# Patient Record
Sex: MALE | Race: WHITE | Employment: FULL TIME | URBAN - METROPOLITAN AREA
[De-identification: names, ages, dates, MRNs, and addresses within clinical notes are randomized per-mention and may not be internally consistent; named-entity substitution may affect disease eponyms.]

---

## 2018-06-22 ENCOUNTER — ED HISTORICAL/CONVERTED ENCOUNTER (OUTPATIENT)
Dept: OTHER | Age: 28
End: 2018-06-22

## 2019-01-08 ENCOUNTER — OFFICE VISIT (OUTPATIENT)
Dept: FAMILY MEDICINE CLINIC | Age: 29
End: 2019-01-08

## 2019-01-08 VITALS
DIASTOLIC BLOOD PRESSURE: 66 MMHG | OXYGEN SATURATION: 96 % | BODY MASS INDEX: 21.28 KG/M2 | TEMPERATURE: 98.1 F | WEIGHT: 152 LBS | HEART RATE: 91 BPM | RESPIRATION RATE: 16 BRPM | SYSTOLIC BLOOD PRESSURE: 108 MMHG | HEIGHT: 71 IN

## 2019-01-08 DIAGNOSIS — F19.10 IV DRUG ABUSE (HCC): ICD-10-CM

## 2019-01-08 DIAGNOSIS — B19.20 HEPATITIS C VIRUS INFECTION WITHOUT HEPATIC COMA, UNSPECIFIED CHRONICITY: ICD-10-CM

## 2019-01-08 DIAGNOSIS — K40.90 SCROTAL HERNIA: ICD-10-CM

## 2019-01-08 DIAGNOSIS — R51.9 CHRONIC NONINTRACTABLE HEADACHE, UNSPECIFIED HEADACHE TYPE: Primary | ICD-10-CM

## 2019-01-08 DIAGNOSIS — G89.29 CHRONIC NONINTRACTABLE HEADACHE, UNSPECIFIED HEADACHE TYPE: Primary | ICD-10-CM

## 2019-01-08 RX ORDER — IBUPROFEN 800 MG/1
TABLET ORAL
COMMUNITY

## 2019-01-08 RX ORDER — SUMATRIPTAN 50 MG/1
50 TABLET, FILM COATED ORAL
Qty: 9 TAB | Refills: 5 | Status: SHIPPED | OUTPATIENT
Start: 2019-01-08 | End: 2019-01-08

## 2019-01-08 RX ORDER — AMITRIPTYLINE HYDROCHLORIDE 25 MG/1
25 TABLET, FILM COATED ORAL
Qty: 30 TAB | Refills: 2 | Status: SHIPPED | OUTPATIENT
Start: 2019-01-08 | End: 2019-10-23

## 2019-01-08 NOTE — PROGRESS NOTES
Nancy Cook is a 33 y.o. male Chief Complaint Patient presents with [facility] Establish Care  Headache Pt states he started getting HA's around Feb of 2017. Pt states he had a heroin addiction and went to longterm in April for 6 months. Pt detoxed in longterm on suboxone with a 7 day taper. He has been sober since and uses no maintenance meds. States HA is in the back of his head and his hearing will decrease some and his neck will get stiff. Denies pounding HA. States the hearing issue comes in waves. Pt has used motrin to help dull this. Pt states while he was in longterm approx 1 month in he got a severe HA and lost feeling in his legs and fell down. Pt went to medical and started having slurred speech then couldn't remember what happened. Pt states he was transferred to [place] and had another episode there. He was taken to the hospital, he believes SSR and was worked up for meningitis negative and had a CT and states he did not get the result. Pt was at ED for the day and released. Pt states he would get leg tingling. Pt states he had a bad HA last night and states since getting out longterm end of September has not had as severe of a HA to have the numbness in legs and blacking out. Pt is using excedrin migraine and feels he is using too much. Pt is taking excedrin migraine 3-4x a day everyday. But states last dose was last week. Heroin was IVDA and pt does report having Hep C. Pt does not have a hepatologist.  Pt will have been sober 9 months on 1/13/18. Pt also reports a hernia R inguinal region states bothers him from time to time. No pain currently and noticed 8 years ago. he is a 33y.o. year old male who presents for evalution. Reviewed PmHx, RxHx, FmHx, SocHx, AllgHx and updated and dated in the chart. Review of Systems - negative except as listed above in the HPI Objective:  
 
Vitals:  
 01/08/19 8375 BP: 108/66 Pulse: 91  
Resp: 16 Temp: 98.1 °F (36.7 °C) TempSrc: Oral  
SpO2: 96% Weight: 152 lb (68.9 kg) Height: 5' 11\" (1.803 m) Current Outpatient Medications Medication Sig  
 aspirin-acetaminophen-caffeine (EXCEDRIN ES) 250-250-65 mg per tablet Take 1 Tab by mouth.  ibuprofen (MOTRIN) 800 mg tablet Take  by mouth.  amitriptyline (ELAVIL) 25 mg tablet Take 1 Tab by mouth nightly.  SUMAtriptan (IMITREX) 50 mg tablet Take 1 Tab by mouth once as needed for Migraine for up to 1 dose. No current facility-administered medications for this visit. Physical Examination: General appearance - alert, well appearing, and in no distress Mental status - alert, oriented to person, place, and time, anxious Neck - supple, no significant adenopathy Chest - clear to auscultation, no wheezes, rales or rhonchi, symmetric air entry Heart - normal rate, regular rhythm, normal S1, S2, no murmurs, rubs, clicks or gallops Abdomen - HERNIA EXAM: left inguinal hernia Neurological - alert, oriented, normal speech, no focal findings or movement disorder noted, cranial nerves II through XII intact Musculoskeletal - no joint tenderness, deformity or swelling Assessment/ Plan:  
Diagnoses and all orders for this visit: 
 
1. Chronic nonintractable headache, unspecified headache type 
-     REFERRAL TO NEUROLOGY 
-     amitriptyline (ELAVIL) 25 mg tablet; Take 1 Tab by mouth nightly. 2. Hepatitis C virus infection without hepatic coma, unspecified chronicity 
-     CBC WITH AUTOMATED DIFF 
-     HIV 1/2 AG/AB, 4TH GENERATION,W RFLX CONFIRM 
-     HEPATITIS C QT BY PCR WITH REFLEX GENOTYPE 
-     METABOLIC PANEL, COMPREHENSIVE 
-     REFERRAL TO LIVER HEPATOLOGY 3. IV drug abuse (HCC) 
-     CBC WITH AUTOMATED DIFF 
-     HIV 1/2 AG/AB, 4TH GENERATION,W RFLX CONFIRM 
-     HEPATITIS C QT BY PCR WITH REFLEX GENOTYPE 
-     METABOLIC PANEL, COMPREHENSIVE 4. Scrotal hernia 
-     REFERRAL TO GENERAL SURGERY -     SUMAtriptan (IMITREX) 50 mg tablet; Take 1 Tab by mouth once as needed for Migraine for up to 1 dose. Follow-up Disposition: 
Return if symptoms worsen or fail to improve. I have discussed the diagnosis with the patient and the intended plan as seen in the above orders. The patient has received an after-visit summary and questions were answered concerning future plans. Pt conveyed understanding of plan. Medication Side Effects and Warnings were discussed with patient [address] [facility], DO

## 2019-01-08 NOTE — PROGRESS NOTES
Chief Complaint Patient presents with [facility] Establish Care  Headache Patient presents in office today to establish PCP. Has c/o headaches off and on for the past 10 months. States that when he gets the headaches his neck gets stiff, he gets dizzy and hard of hearing. Treating with Excedrin with some relief. No other concerns. Learning Assessment 1/8/2019 PRIMARY LEARNER Patient HIGHEST LEVEL OF EDUCATION - PRIMARY LEARNER  GRADUATED HIGH SCHOOL OR GED  
BARRIERS PRIMARY LEARNER NONE PRIMARY LANGUAGE ENGLISH  
LEARNER PREFERENCE PRIMARY DEMONSTRATION  
ANSWERED BY patient RELATIONSHIP SELF

## 2019-01-09 LAB
ALBUMIN SERPL-MCNC: 4.7 G/DL (ref 3.5–5.5)
ALBUMIN/GLOB SERPL: 1.7 {RATIO} (ref 1.2–2.2)
ALP SERPL-CCNC: 68 IU/L (ref 39–117)
ALT SERPL-CCNC: 39 IU/L (ref 0–44)
AST SERPL-CCNC: 25 IU/L (ref 0–40)
BASOPHILS # BLD AUTO: 0 X10E3/UL (ref 0–0.2)
BASOPHILS NFR BLD AUTO: 0 %
BILIRUB SERPL-MCNC: 0.4 MG/DL (ref 0–1.2)
BUN SERPL-MCNC: 16 MG/DL (ref 6–20)
BUN/CREAT SERPL: 20 (ref 9–20)
CALCIUM SERPL-MCNC: 9.8 MG/DL (ref 8.7–10.2)
CHLORIDE SERPL-SCNC: 104 MMOL/L (ref 96–106)
CO2 SERPL-SCNC: 23 MMOL/L (ref 20–29)
CREAT SERPL-MCNC: 0.81 MG/DL (ref 0.76–1.27)
EOSINOPHIL # BLD AUTO: 0.1 X10E3/UL (ref 0–0.4)
EOSINOPHIL NFR BLD AUTO: 1 %
ERYTHROCYTE [DISTWIDTH] IN BLOOD BY AUTOMATED COUNT: 13.2 % (ref 12.3–15.4)
GLOBULIN SER CALC-MCNC: 2.8 G/DL (ref 1.5–4.5)
GLUCOSE SERPL-MCNC: 88 MG/DL (ref 65–99)
HCT VFR BLD AUTO: 43.7 % (ref 37.5–51)
HGB BLD-MCNC: 14.6 G/DL (ref 13–17.7)
HIV 1+2 AB+HIV1 P24 AG SERPL QL IA: NON REACTIVE
IMM GRANULOCYTES # BLD AUTO: 0 X10E3/UL (ref 0–0.1)
IMM GRANULOCYTES NFR BLD AUTO: 0 %
LYMPHOCYTES # BLD AUTO: 2 X10E3/UL (ref 0.7–3.1)
LYMPHOCYTES NFR BLD AUTO: 21 %
MCH RBC QN AUTO: 30.3 PG (ref 26.6–33)
MCHC RBC AUTO-ENTMCNC: 33.4 G/DL (ref 31.5–35.7)
MCV RBC AUTO: 91 FL (ref 79–97)
MONOCYTES # BLD AUTO: 0.7 X10E3/UL (ref 0.1–0.9)
MONOCYTES NFR BLD AUTO: 7 %
NEUTROPHILS # BLD AUTO: 6.7 X10E3/UL (ref 1.4–7)
NEUTROPHILS NFR BLD AUTO: 71 %
PLATELET # BLD AUTO: 298 X10E3/UL (ref 150–379)
POTASSIUM SERPL-SCNC: 4.3 MMOL/L (ref 3.5–5.2)
PROT SERPL-MCNC: 7.5 G/DL (ref 6–8.5)
RBC # BLD AUTO: 4.82 X10E6/UL (ref 4.14–5.8)
SODIUM SERPL-SCNC: 142 MMOL/L (ref 134–144)
WBC # BLD AUTO: 9.5 X10E3/UL (ref 3.4–10.8)

## 2019-01-09 NOTE — PROGRESS NOTES
HIV is negative and labs are all normal.  I am still waiting for the Hep C lab to come back and this will take up to a week.

## 2019-01-11 LAB
HCV GENOTYPE: NORMAL
HCV GENTYP SERPL NAA+PROBE: NORMAL
HCV RNA SERPL NAA+PROBE-ACNC: NORMAL IU/ML
HCV RNA SERPL NAA+PROBE-LOG IU: 6.29 LOG10 IU/ML
PLEASE NOTE, 550474: NORMAL
TEST INFORMATION: NORMAL

## 2019-01-11 NOTE — PROGRESS NOTES
Called and spoke with patient in reference to labs.  Patient verbalized understanding and will contact hepatology

## 2019-01-15 ENCOUNTER — OFFICE VISIT (OUTPATIENT)
Dept: NEUROLOGY | Age: 29
End: 2019-01-15

## 2019-01-15 VITALS
OXYGEN SATURATION: 97 % | SYSTOLIC BLOOD PRESSURE: 110 MMHG | BODY MASS INDEX: 21.56 KG/M2 | HEART RATE: 107 BPM | DIASTOLIC BLOOD PRESSURE: 77 MMHG | HEIGHT: 71 IN | RESPIRATION RATE: 20 BRPM | WEIGHT: 154 LBS

## 2019-01-15 DIAGNOSIS — G44.209 TENSION VASCULAR HEADACHE: Primary | ICD-10-CM

## 2019-01-15 NOTE — PROGRESS NOTES
Patient is new, has been having some paralysis from the knees down- happened only once or twice. Headaches- started March 2018, once or twice a week, happening in the back of the head. Neck stiffness. Patient states that he will have some hearing loss.

## 2019-01-15 NOTE — PROGRESS NOTES
Name:  Delgado Ramirez      :  1990    PCP:   Meggan Dyer DO      Referring:  As above  MRN:   [id number]    Chief Complaint:   Chief Complaint   Patient presents with    Headache     New patient-Headaches, Numbness, Paralysis       HISTORY OF PRESENT ILLNESS:     This is a 33 y.o. male with PMHx Substance Abuse/ IVDU-Heroin status post detox in residential (2018) on Suboxone x 7 days and sober since, Hx of Hep C+, who is referred to discuss frequent headaches. Reviewed Dr Alexa Min clinic note dated 19. Pt describes pain on back of head associated with neck stiffness and some change/ decrease in hearing. The hearing issues is intermittent. Uses Motrin to dull head/ neck pain. While in residential, he reports having an episode where he had a severe headache, lost feeling in his legs, fell down, had slurred speech, and was transferred to [facility] ER for evaluation and had another episode while there. He recalls having a CT head while there but doesn't know the results. Got out of residential in 2018, hasn't been having as severe headaches (not to the point of leg numbness or blacking out), but using Excedrin migraine 3-4 times a day. Was prescribed Amitriptyline 25 mg tablet QHS and sumatriptan 50 mg tab to use prn but hasn't picked up Rxs yet. Describes that everyday he has a pressure sensation from mostly in back of head and down into neck, occasional nausea and dizziness, no vomiting, sometimes light or sensitivity. Duration: 6 hrs or full day until he takes something He can't identify any triggers, still taking the Excedrin 3-4 times a day. Denies depression, says has mild intermittent anxiety.   Sleeping on average 6-8 hours, going to sleep around 11 PM most nights waking around 7-8 AM.      Complete Review of Systems: + fatigue, headaches, hepatitis, nausea/ vomiting, tinnitus; otherwise as noted in HPI     No Known Allergies  Past Medical History:   Diagnosis Date    Migraine      Current Outpatient Medications   Medication Sig Dispense Refill    aspirin-acetaminophen-caffeine (EXCEDRIN ES) 250-250-65 mg per tablet Take 1 Tab by mouth.  ibuprofen (MOTRIN) 800 mg tablet Take  by mouth.  amitriptyline (ELAVIL) 25 mg tablet Take 1 Tab by mouth nightly. 30 Tab 2     Past Surgical History:   Procedure Laterality Date    HX ORTHOPAEDIC       Family History   Adopted: Yes     Social History     Socioeconomic History    Marital status: SINGLE     Spouse name: Not on file    Number of children: Not on file    Years of education: Not on file    Highest education level: Not on file   Social Needs    Financial resource strain: Not on file    Food insecurity - worry: Not on file    Food insecurity - inability: Not on file    Transportation needs - medical: Not on file   [employer] needs - non-medical: Not on file   Occupational History    Not on file   Tobacco Use    Smoking status: Current Every Day Smoker     Packs/day: 1.00    Smokeless tobacco: Never Used   Substance and Sexual Activity    Alcohol use: Yes     Alcohol/week: 6.0 oz     Types: 10 Cans of beer per week    Drug use: No    Sexual activity: Yes     Birth control/protection: None   Other Topics Concern    Not on file   Social History Narrative    Not on file       PHYSICAL EXAM  Vitals:    01/15/19 1458   BP: 110/77   Pulse: (!) 107   Resp: 20   SpO2: 97%   Weight: 69.9 kg (154 lb)   Height: 5' 11\" (1.803 m)       General:  Alert, appears anxious but pleasant and coopertive   Head:  Normocephalic, atraumatic. Eyes:  Conjunctivae/corneas clear   Lungs:  Heart:  Non labored breathing  Mild tachycardia    Extremities: No edema.    Skin: No rashes    Neurologic Exam       Language: normal  Memory:  Alert, oriented to person, place, situation    Cranial Nerves:  I: smell Not tested   II: visual fields Full to confrontation   II: pupils Equal, round, reactive to light   II: optic disc No papilledema   III,VII: ptosis none   III,IV,VI: extraocular muscles  normal   V: facial light touch sensation  normal   VII: facial muscle function  symmetric   VIII: hearing symmetric   IX: soft palate elevation  normal   XI: sternocleidomastoid strength 5/5   XII: tongue  midline      Motor: normal bulk, tone, strength in all exts  Sensory: intact to LT x 4 exts   Cerebellar: no rest tremors, + mild bilateral postural and intention tremor   Normal FNF and H-Shin bilaterally  Reflexes: 2+ throughout  Plantar response: not examined     Gait: normal    Romberg negative     ASSESSMENT AND PLAN    ICD-10-CM ICD-9-CM    1. Tension vascular headache G44.209 307.81        Discussed with patient that the amitriptyline he was prescribed by PCP but hasn't started yet is to reduce headache frequency. Advised him to start that, to stop all OTC headache pain relievers (due to potential rebound headache), and to also  the Rx for Sumatriptan 50 mg tablets and only use them 2 day a week if needed. If headaches are still frequent, will increase the amitriptyline. Patient agreed to that plan. F/u in 6 weeks.        Signed By: Eva Piper MD     January 15, 2019

## 2019-01-29 ENCOUNTER — OFFICE VISIT (OUTPATIENT)
Dept: HEMATOLOGY | Age: 29
End: 2019-01-29

## 2019-01-29 VITALS
TEMPERATURE: 97.9 F | OXYGEN SATURATION: 100 % | HEART RATE: 101 BPM | WEIGHT: 142.6 LBS | DIASTOLIC BLOOD PRESSURE: 58 MMHG | SYSTOLIC BLOOD PRESSURE: 102 MMHG | HEIGHT: 71 IN | BODY MASS INDEX: 19.96 KG/M2

## 2019-01-29 DIAGNOSIS — B18.2 CHRONIC HEPATITIS C WITHOUT HEPATIC COMA (HCC): Primary | ICD-10-CM

## 2019-01-29 PROBLEM — G44.229 CHRONIC TENSION HEADACHES: Status: ACTIVE | Noted: 2019-01-29

## 2019-01-29 RX ORDER — SUMATRIPTAN 50 MG/1
TABLET, FILM COATED ORAL
Refills: 5 | COMMUNITY
Start: 2019-01-21 | End: 2019-10-11

## 2019-01-29 NOTE — PROGRESS NOTES
[address] [address] [address], [place], [address] [address], [place], [place]       Bruno Monsalve, JULI Avitia, RANJAN-BC   KAY Franks NP [address] [facility] [address]    at [address], [address] [place]  22.    [phone]    FAX: [address] [place] [address], [address], [address]    [phone]    FAX: [phone]       Patient Care Team:  Niki Bowles DO as PCP - General (Family Practice)      Problem List  Date Reviewed: 1/29/2019          Codes Class Noted    Chronic hepatitis C ([facility] ICD-10-CM: B18.2  ICD-9-CM: 070.54  1/29/2019        Chronic tension headaches ICD-10-CM: F88.520  ICD-9-CM: 339.12  1/29/2019              The clinicians listed above have asked me to see Jacques Cochran in consultation regarding chronic HCV and its management. All medical records sent by the referring physicians were reviewed. The patient is a 33 y.o.  male who was found to have abnormalities in liver chemistries and subsequently tested positive for chronic HCV after being incarcerated in 4/2018. Risk factors for acquiring HCV are IV drug use between the years of 2877-3846. He went through detox program while in long term and was released in 9/2018, he has been without IVD for ~9 months. Imaging of the liver was not performed. An assessment of liver fibrosis with biopsy or elastography has not been performed. The patient has never received treatment for chronic HCV. The patient has no symptoms which can be attributed to the liver disorder.     The patient has not experienced fatigue, fevers, chills, diffuse abdominal pain, nausea, vomiting, constipation, diarrhea, yellowing of the eyes or skin, swelling of the abdomen, or swelling of the lower extremities. The patient completes all daily activities without any functional limitations. ASSESSMENT AND PLAN:  Chronic HCV   Chronic HCV of unclear severity. Severity of the liver disease will be assessed by estimate with HCV Fibrosure, I have drawn this in the office today. He is unlikely to have advanced fibrosis as he has had exposures to HCV within the past 10 years and has well-supported laboratory studies. Liver transaminases are normal.  Liver function is normal.  The platelet count is normal.      Based upon laboratory studies the patient does not appear to have significant liver injury. As above, will determine HCV Fibrosure at baseline. Will perform  virologic studies to assess for other causes of chronic liver disease. The patient has not been treated for HCV. The patient has HCV genotype 1A. We have discussed the treatment alternatives. The SVR/cure rate for HCV now exceeds 97% without significant side effects for most patients with HCV. The specific treatment is dependent upon genotype, viral load and histology. Patient should be treated with a 8 week course of Harvoni (sofasbuvir and ledipasvir) or Mavyret (glecaprevir and piprentasvir). I have reviewed with him the administration and side effects of these medications and he is willing to proceed, this will be ordered. Treatment of other medical problems in patients with chronic liver disease  There are no contraindications for the patient to take most medications that are necessary for treatment of other medical issues. Patient may remain on present medications for management of headaches. Counseling for alcohol in patients with chronic liver disease  The patient was counseled regarding alcohol consumption and the effect of alcohol on chronic liver disease. The patient does not consume any significant amount of alcohol.  I have asked him to eliminate all alcohol during the time of his HCV therapy. Drug use  The patient was counseled regarding the risk of overdose and death from using narcotic drugs and the risk of becoming reinfected with HCV once they are cured if they resume narcotic drug use. The patient has not used drugs since 4/2018. Vaccinations   The need for vaccination against viral hepatitis A and B will be assessed with serologic and instituted as appropriate. Routine vaccinations against other bacterial and viral agents can be performed as indicated. Annual flu vaccination should be administered if indicated. ALLERGIES  No Known Allergies    MEDICATIONS  Current Outpatient Medications   Medication Sig    SUMAtriptan (IMITREX) 50 mg tablet TK 1 T PO 1 TIME FOR UP TO 1 DOSE PRF MIGRAINE    aspirin-acetaminophen-caffeine (EXCEDRIN ES) 250-250-65 mg per tablet Take 1 Tab by mouth every six (6) hours as needed.  ibuprofen (MOTRIN) 800 mg tablet Take  by mouth every six (6) hours as needed.  amitriptyline (ELAVIL) 25 mg tablet Take 1 Tab by mouth nightly. No current facility-administered medications for this visit. SYSTEM REVIEW NOT RELATED TO LIVER DISEASE OR REVIEWED ABOVE:  Constitution systems: Negative for fever, chills, weight gain, weight loss. Eyes: Negative for visual changes. ENT: Negative for sore throat, painful swallowing. Respiratory: Negative for cough, hemoptysis, SOB. Cardiology: Negative for chest pain, palpitations. GI:  Negative for constipation or diarrhea. : Negative for urinary frequency, dysuria, hematuria, nocturia. Skin: Negative for rash. Hematology: Negative for easy bruising, blood clots. Musculo-skeletal: Negative for back pain, muscle pain, weakness. Neurologic: Negative for dizziness, vertigo, memory problems not related to HE. Positive for headaches. Psychology: Negative for anxiety, depression.      FAMILY HISTORY:  The patient is adopted and does not know any of his family history. SOCIAL HISTORY:  The patient has never been . The patient has no children. The patient currently smokes 1 pack of tobacco daily. The patient consumes 3-5 alcoholic beverages per week. The patient currently works full time . PHYSICAL EXAMINATION:  Visit Vitals  /58 (BP 1 Location: Left arm, BP Patient Position: Sitting)   Pulse (!) 101   Temp 97.9 °F (36.6 °C) (Tympanic)   Ht 5' 11\" (1.803 m)   Wt 142 lb 9.6 oz (64.7 kg)   SpO2 100%   BMI 19.89 kg/m²     General: No acute distress. Eyes: Sclera anicteric. ENT: No oral lesions. Thyroid normal.  Nodes: No adenopathy. Skin: No spider angiomata. No jaundice. No palmar erythema. Respiratory: Lungs clear to auscultation. Cardiovascular: Regular heart rate. No murmurs. No JVD. Abdomen: Soft non-tender. Liver size normal to percussion/palpation. Spleen not palpable. No obvious ascites. Extremities: No edema. No muscle wasting. No gross arthritic changes. Neurologic: Alert and oriented. Cranial nerves grossly intact. No asterixis. LABORATORY STUDIES:  [facility] [place] of [address] [place] [address] Units 1/8/2019   WBC 3.4 - 10.8 x10E3/uL 9.5   ANC 1.4 - 7.0 x10E3/uL 6.7   HGB 13.0 - 17.7 g/dL 14.6    - 379 x10E3/uL 298   AST 0 - 40 IU/L 25   ALT 0 - 44 IU/L 39   Alk Phos 39 - 117 IU/L 68   Bili, Total 0.0 - 1.2 mg/dL 0.4   Albumin 3.5 - 5.5 g/dL 4.7   BUN 6 - 20 mg/dL 16   Creat 0.76 - 1.27 mg/dL 0.81   Na 134 - 144 mmol/L 142   K 3.5 - 5.2 mmol/L 4.3   Cl 96 - 106 mmol/L 104   CO2 20 - 29 mmol/L 23   Glucose 65 - 99 mg/dL 88   Additional lab values drawn at today's office visit are pending at the time of documentation. SEROLOGIES:  Serologies Latest Ref Rng & Units 1/8/2019   Hep C Genotype  1a   HCV RT-PCR, Quant IU/mL 1,930,000   HCV Log10 log10 IU/mL 6.286     LIVER HISTOLOGY:  Not available or performed.  Additional lab values drawn at today's office visit are pending at the time of documentation. ENDOSCOPIC PROCEDURES:  Not available or performed    RADIOLOGY:  Not available or performed    OTHER TESTING:  Not available or performed    FOLLOW-UP:  All of the issues listed above in the Assessment and Plan were discussed with the patient. All questions were answered. The patient expressed a clear understanding of the above. [address] [place] [address] within 4 weeks of initiation of HCV therapy.     Rossy Kirby PA-C  [facility] [place] [address], [address] [facility], [facility] 22.  [phone]

## 2019-01-29 NOTE — PROGRESS NOTES
Chief Complaint   Patient presents with    New Patient     Hep C      Visit Vitals  /58 (BP 1 Location: Left arm, BP Patient Position: Sitting)   Pulse (!) 101   Temp 97.9 °F (36.6 °C) (Tympanic)   Ht 5' 11\" (1.803 m)   Wt 142 lb 9.6 oz (64.7 kg)   SpO2 100%   BMI 19.89 kg/m²     PHQ over the last two weeks 1/29/2019   Little interest or pleasure in doing things Not at all   Feeling down, depressed, irritable, or hopeless Not at all   Total Score PHQ 2 0     Learning Assessment 1/29/2019   PRIMARY LEARNER Patient   HIGHEST LEVEL OF EDUCATION - PRIMARY LEARNER  -   BARRIERS PRIMARY LEARNER NONE   CO-LEARNER CAREGIVER No   PRIMARY LANGUAGE ENGLISH   LEARNER PREFERENCE PRIMARY LISTENING   ANSWERED BY patient   RELATIONSHIP SELF     Abuse Screening Questionnaire 1/29/2019   Do you ever feel afraid of your partner? N   Are you in a relationship with someone who physically or mentally threatens you? N   Is it safe for you to go home?  Slava Boyd

## 2019-01-30 LAB
HAV AB SER QL IA: NEGATIVE
HBV CORE AB SERPL QL IA: NEGATIVE
HBV SURFACE AB SER QL: REACTIVE
HBV SURFACE AG SERPL QL IA: NEGATIVE

## 2019-01-31 LAB
A2 MACROGLOB SERPL-MCNC: 161 MG/DL (ref 110–276)
ALT SERPL W P-5'-P-CCNC: 51 IU/L (ref 0–55)
APO A-I SERPL-MCNC: 151 MG/DL (ref 101–178)
BILIRUB SERPL-MCNC: 0.3 MG/DL (ref 0–1.2)
COMMENT: ABNORMAL
FIBROSIS SCORING:, 550107: ABNORMAL
FIBROSIS STAGE SERPL QL: ABNORMAL
GGT SERPL-CCNC: 45 IU/L (ref 0–65)
HAPTOGLOB SERPL-MCNC: 179 MG/DL (ref 34–200)
INTERPRETATION, 550106: ABNORMAL
LIVER FIBR SCORE SERPL CALC.FIBROSURE: 0.06 (ref 0–0.21)
NECROINFLAMM ACTIVITY SCORING:, 550121: ABNORMAL
NECROINFLAMMATORY ACT GRADE SERPL QL: ABNORMAL
NECROINFLAMMATORY ACT SCORE SERPL: 0.23 (ref 0–0.17)
SERVICE CMNT-IMP: ABNORMAL

## 2019-10-11 ENCOUNTER — OFFICE VISIT (OUTPATIENT)
Dept: FAMILY MEDICINE CLINIC | Age: 29
End: 2019-10-11

## 2019-10-11 ENCOUNTER — HOSPITAL ENCOUNTER (OUTPATIENT)
Dept: GENERAL RADIOLOGY | Age: 29
Discharge: HOME | End: 2019-10-11
Payer: COMMERCIAL

## 2019-10-11 VITALS
WEIGHT: 145 LBS | HEART RATE: 81 BPM | SYSTOLIC BLOOD PRESSURE: 124 MMHG | OXYGEN SATURATION: 98 % | TEMPERATURE: 97.8 F | DIASTOLIC BLOOD PRESSURE: 80 MMHG | BODY MASS INDEX: 20.3 KG/M2 | RESPIRATION RATE: 18 BRPM | HEIGHT: 71 IN

## 2019-10-11 DIAGNOSIS — R51.9 CHRONIC NONINTRACTABLE HEADACHE, UNSPECIFIED HEADACHE TYPE: Primary | ICD-10-CM

## 2019-10-11 DIAGNOSIS — R51.9 RECURRENT HEADACHE: ICD-10-CM

## 2019-10-11 DIAGNOSIS — A60.01 HERPES SIMPLEX INFECTION OF PENIS: ICD-10-CM

## 2019-10-11 DIAGNOSIS — M54.2 NECK PAIN: ICD-10-CM

## 2019-10-11 DIAGNOSIS — G89.29 CHRONIC NONINTRACTABLE HEADACHE, UNSPECIFIED HEADACHE TYPE: Primary | ICD-10-CM

## 2019-10-11 PROCEDURE — 72050 X-RAY EXAM NECK SPINE 4/5VWS: CPT

## 2019-10-11 RX ORDER — VALACYCLOVIR HYDROCHLORIDE 500 MG/1
500 TABLET, FILM COATED ORAL 2 TIMES DAILY
Qty: 6 TAB | Refills: 5 | Status: SHIPPED | OUTPATIENT
Start: 2019-10-11 | End: 2019-10-14

## 2019-10-11 RX ORDER — TOPIRAMATE 25 MG/1
TABLET ORAL
Qty: 72 TAB | Refills: 0 | Status: SHIPPED | OUTPATIENT
Start: 2019-10-11

## 2019-10-11 RX ORDER — CYCLOBENZAPRINE HCL 5 MG
5 TABLET ORAL
Qty: 30 TAB | Refills: 2 | Status: SHIPPED | OUTPATIENT
Start: 2019-10-11

## 2019-10-11 RX ORDER — SUMATRIPTAN 100 MG/1
100 TABLET, FILM COATED ORAL
Qty: 9 TAB | Refills: 5 | Status: SHIPPED | OUTPATIENT
Start: 2019-10-11 | End: 2019-10-11
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
## 2019-10-11 NOTE — PROGRESS NOTES
Chief Complaint   Patient presents with    Headache    Neck Pain     Patient presents in office today with c/o neck pain that travels up to his head. States that yesterday he felt disoriented and had numbness in his arms. No other concerns. 1. Have you been to the ER, urgent care clinic since your last visit? Hospitalized since your last visit? No    2. Have you seen or consulted any other health care providers outside of the [address] [place] [address] since your last visit? Include any pap smears or colon screening.  No    Learning Assessment 1/29/2019   PRIMARY LEARNER Patient   HIGHEST LEVEL OF EDUCATION - PRIMARY LEARNER  -   BARRIERS PRIMARY LEARNER NONE   CO-LEARNER CAREGIVER No   PRIMARY LANGUAGE ENGLISH   LEARNER PREFERENCE PRIMARY LISTENING   ANSWERED BY patient   RELATIONSHIP SELF

## 2019-10-11 NOTE — PATIENT INSTRUCTIONS
A Healthy Lifestyle: Care Instructions  Your Care Instructions    A healthy lifestyle can help you feel good, stay at a healthy weight, and have plenty of energy for both work and play. A healthy lifestyle is something you can share with your whole family. A healthy lifestyle also can lower your risk for serious health problems, such as high blood pressure, heart disease, and diabetes. You can follow a few steps listed below to improve your health and the health of your family. Follow-up care is a key part of your treatment and safety. Be sure to make and go to all appointments, and call your doctor if you are having problems. It's also a good idea to know your test results and keep a list of the medicines you take. How can you care for yourself at home? · Do not eat too much sugar, fat, or fast foods. You can still have dessert and treats now and then. The goal is moderation. · Start small to improve your eating habits. Pay attention to portion sizes, drink less juice and soda pop, and eat more fruits and vegetables. ? Eat a healthy amount of food. A 3-ounce serving of meat, for example, is about the size of a deck of cards. Fill the rest of your plate with vegetables and whole grains. ? Limit the amount of soda and sports drinks you have every day. Drink more water when you are thirsty. ? Eat at least 5 servings of fruits and vegetables every day. It may seem like a lot, but it is not hard to reach this goal. A serving or helping is 1 piece of fruit, 1 cup of vegetables, or 2 cups of leafy, raw vegetables. Have an apple or some carrot sticks as an afternoon snack instead of a candy bar. Try to have fruits and/or vegetables at every meal.  · Make exercise part of your daily routine. You may want to start with simple activities, such as walking, bicycling, or slow swimming. Try to be active 30 to 60 minutes every day. You do not need to do all 30 to 60 minutes all at once.  For example, you can exercise 3 times a day for 10 or 20 minutes. Moderate exercise is safe for most people, but it is always a good idea to talk to your doctor before starting an exercise program.  · Keep moving. Elnor Friar the lawn, work in the garden, or [employer]. Take the stairs instead of the elevator at work. · If you smoke, quit. People who smoke have an increased risk for heart attack, stroke, cancer, and other lung illnesses. Quitting is hard, but there are ways to boost your chance of quitting tobacco for good. ? Use nicotine gum, patches, or lozenges. ? Ask your doctor about stop-smoking programs and medicines. ? Keep trying. In addition to reducing your risk of diseases in the future, you will notice some benefits soon after you stop using tobacco. If you have shortness of breath or asthma symptoms, they will likely get better within a few weeks after you quit. · Limit how much alcohol you drink. Moderate amounts of alcohol (up to 2 drinks a day for men, 1 drink a day for women) are okay. But drinking too much can lead to liver problems, high blood pressure, and other health problems. Family health  If you have a family, there are many things you can do together to improve your health. · Eat meals together as a family as often as possible. · Eat healthy foods. This includes fruits, vegetables, lean meats and dairy, and whole grains. · Include your family in your fitness plan. Most people think of activities such as jogging or tennis as the way to fitness, but there are many ways you and your family can be more active. Anything that makes you breathe hard and gets your heart pumping is exercise. Here are some tips:  ? Walk to do errands or to take your child to school or the bus.  ? Go for a family bike ride after dinner instead of watching TV. Where can you learn more? Go to http://apurva-louise.info/. Enter S414 in the search box to learn more about \"A Healthy Lifestyle: Care Instructions. \"  Current as of: May 28, 2019  Content Version: 12.2  © 7332-0715 [employer]. Care instructions adapted under license by [employer] (which disclaims liability or warranty for this information). If you have questions about a medical condition or this instruction, always ask your healthcare professional. [address] any warranty or liability for your use of this information.

## 2019-10-12 ENCOUNTER — APPOINTMENT (OUTPATIENT)
Dept: CT IMAGING | Age: 29
End: 2019-10-12
Attending: PHYSICIAN ASSISTANT
Payer: COMMERCIAL

## 2019-10-12 ENCOUNTER — HOSPITAL ENCOUNTER (EMERGENCY)
Age: 29
Discharge: HOME | End: 2019-10-12
Attending: EMERGENCY MEDICINE
Payer: COMMERCIAL

## 2019-10-12 VITALS
SYSTOLIC BLOOD PRESSURE: 150 MMHG | DIASTOLIC BLOOD PRESSURE: 83 MMHG | RESPIRATION RATE: 18 BRPM | HEART RATE: 93 BPM | OXYGEN SATURATION: 97 % | TEMPERATURE: 97.8 F

## 2019-10-12 DIAGNOSIS — R51.9 NONINTRACTABLE HEADACHE, UNSPECIFIED CHRONICITY PATTERN, UNSPECIFIED HEADACHE TYPE: Primary | ICD-10-CM

## 2019-10-12 LAB
COMMENT, HOLDF: NORMAL
SAMPLES BEING HELD,HOLD: NORMAL

## 2019-10-12 PROCEDURE — 96375 TX/PRO/DX INJ NEW DRUG ADDON: CPT

## 2019-10-12 PROCEDURE — 74011000250 HC RX REV CODE- 250: Performed by: PHYSICIAN ASSISTANT

## 2019-10-12 PROCEDURE — 99283 EMERGENCY DEPT VISIT LOW MDM: CPT

## 2019-10-12 PROCEDURE — 74011250636 HC RX REV CODE- 250/636: Performed by: PHYSICIAN ASSISTANT

## 2019-10-12 PROCEDURE — 70450 CT HEAD/BRAIN W/O DYE: CPT

## 2019-10-12 PROCEDURE — 96374 THER/PROPH/DIAG INJ IV PUSH: CPT

## 2019-10-12 RX ORDER — DIPHENHYDRAMINE HYDROCHLORIDE 50 MG/ML
25 INJECTION, SOLUTION INTRAMUSCULAR; INTRAVENOUS
Status: COMPLETED | OUTPATIENT
Start: 2019-10-12 | End: 2019-10-12

## 2019-10-12 RX ORDER — ONDANSETRON 2 MG/ML
4 INJECTION INTRAMUSCULAR; INTRAVENOUS
Status: COMPLETED | OUTPATIENT
Start: 2019-10-12 | End: 2019-10-12

## 2019-10-12 RX ADMIN — ONDANSETRON 4 MG: 2 INJECTION INTRAMUSCULAR; INTRAVENOUS at 08:18

## 2019-10-12 RX ADMIN — SODIUM CHLORIDE 10 MG: 9 INJECTION INTRAMUSCULAR; INTRAVENOUS; SUBCUTANEOUS at 08:18

## 2019-10-12 RX ADMIN — DIPHENHYDRAMINE HYDROCHLORIDE 25 MG: 50 INJECTION, SOLUTION INTRAMUSCULAR; INTRAVENOUS at 08:18

## 2019-10-12 RX ADMIN — SODIUM CHLORIDE 1000 ML: 900 INJECTION, SOLUTION INTRAVENOUS at 08:18

## 2019-10-12 NOTE — DISCHARGE INSTRUCTIONS
Patient Education        Headache: Care Instructions  Your Care Instructions    Headaches have many possible causes. Most headaches aren't a sign of a more serious problem, and they will get better on their own. Home treatment may help you feel better faster. The doctor has checked you carefully, but problems can develop later. If you notice any problems or new symptoms, get medical treatment right away. Follow-up care is a key part of your treatment and safety. Be sure to make and go to all appointments, and call your doctor if you are having problems. It's also a good idea to know your test results and keep a list of the medicines you take. How can you care for yourself at home? · Do not drive if you have taken a prescription pain medicine. · Rest in a quiet, dark room until your headache is gone. Close your eyes and try to relax or go to sleep. Don't watch TV or read. · Put a cold, moist cloth or cold pack on the painful area for 10 to 20 minutes at a time. Put a thin cloth between the cold pack and your skin. · Use a warm, moist towel or a heating pad set on low to relax tight shoulder and neck muscles. · Have someone gently massage your neck and shoulders. · Take pain medicines exactly as directed. ? If the doctor gave you a prescription medicine for pain, take it as prescribed. ? If you are not taking a prescription pain medicine, ask your doctor if you can take an over-the-counter medicine. · Be careful not to take pain medicine more often than the instructions allow, because you may get worse or more frequent headaches when the medicine wears off. · Do not ignore new symptoms that occur with a headache, such as a fever, weakness or numbness, vision changes, or confusion. These may be signs of a more serious problem. To prevent headaches  · Keep a headache diary so you can figure out what triggers your headaches. Avoiding triggers may help you prevent headaches.  Record when each headache began, how long it lasted, and what the pain was like (throbbing, aching, stabbing, or dull). Write down any other symptoms you had with the headache, such as nausea, flashing lights or dark spots, or sensitivity to bright light or loud noise. Note if the headache occurred near your period. List anything that might have triggered the headache, such as certain foods (chocolate, cheese, wine) or odors, smoke, bright light, stress, or lack of sleep. · Find healthy ways to deal with stress. Headaches are most common during or right after stressful times. Take time to relax before and after you do something that has caused a headache in the past.  · Try to keep your muscles relaxed by keeping good posture. Check your jaw, face, neck, and shoulder muscles for tension, and try relaxing them. When sitting at a desk, change positions often, and stretch for 30 seconds each hour. · Get plenty of sleep and exercise. · Eat regularly and well. Long periods without food can trigger a headache. · Treat yourself to a massage. Some people find that regular massages are very helpful in relieving tension. · Limit caffeine by not drinking too much coffee, tea, or soda. But don't quit caffeine suddenly, because that can also give you headaches. · Reduce eyestrain from computers by blinking frequently and looking away from the computer screen every so often. Make sure you have proper eyewear and that your monitor is set up properly, about an arm's length away. · Seek help if you have depression or anxiety. Your headaches may be linked to these conditions. Treatment can both prevent headaches and help with symptoms of anxiety or depression. When should you call for help? Call 911 anytime you think you may need emergency care. For example, call if:    · You have signs of a stroke. These may include:  ? Sudden numbness, paralysis, or weakness in your face, arm, or leg, especially on only one side of your body.   ? Sudden vision changes. ? Sudden trouble speaking. ? Sudden confusion or trouble understanding simple statements. ? Sudden problems with walking or balance. ? A sudden, severe headache that is different from past headaches.    Call your doctor now or seek immediate medical care if:    · You have a new or worse headache.     · Your headache gets much worse. Where can you learn more? Go to http://apurva-louise.info/. Enter M271 in the search box to learn more about \"Headache: Care Instructions. \"  Current as of: March 28, 2019  Content Version: 12.2  © 8196-6374 [employer]. Care instructions adapted under license by [employer] (which disclaims liability or warranty for this information). If you have questions about a medical condition or this instruction, always ask your healthcare professional. [place] [address] any warranty or liability for your use of this information. We hope that we have addressed all of your medical concerns. The examination and treatment you received in the Emergency Department were for an emergent problem and were not intended as complete care. It is important that you follow up with your healthcare provider(s) for ongoing care. If your symptoms worsen or do not improve as expected, and you are unable to reach your usual health care provider(s), you should return to the Emergency Department. Today's healthcare is undergoing tremendous change, and patient satisfaction surveys are one of the many tools to assess the quality of medical care. You may receive a survey from the [employer] organization regarding your experience in the Emergency Department. I hope that your experience has been completely positive, particularly the medical care that I provided. As such, please participate in the survey; anything less than excellent does not meet my expectations or intentions.         [address] [address] [facility] participate in nationally recognized quality of care measures. If your blood pressure is greater than 120/80, as reported below, we urge that you seek medical care to address the potential of high blood pressure, commonly known as hypertension. Hypertension can be hereditary or can be caused by certain medical conditions, pain, stress, or \"white coat syndrome. \"       Please make an appointment with your health care provider(s) for follow up of your Emergency Department visit. VITALS:   Patient Vitals for the past 8 hrs:   Temp Pulse Resp BP SpO2   10/12/19 0755 97.8 °F (36.6 °C) 93 18 150/83 97 %   10/12/19 0749 -- (!) 122 -- -- 99 %          Thank you for allowing us to provide you with medical care today. We realize that you have many choices for your emergency care needs. Please choose us in the future for any continued health care needs. Jorge Garcia, [address] [place] [address].   Office: [phone]            Recent Results (from the past 24 hour(s))   SAMPLES BEING HELD    Collection Time: 10/12/19  8:00 AM   Result Value Ref Range    SAMPLES BEING HELD 1RD,1BLU,1LAV,1PST     COMMENT        Add-on orders for these samples will be processed based on acceptable specimen integrity and analyte stability, which may vary by analyte. Xr Spine Cerv 4 Or 5 V    Result Date: 10/11/2019  Indication: Cervicalgia, recurrent headaches Five views of the cervical spine demonstrate normal alignment without evidence of acute fracture, subluxation, or prevertebral edema. Mild spondylosis is noted at C3-4 and C4-5. Neural foramen are widely patent. Impression: Mild spondylitic changes without acute abnormality. Ct Head Wo Cont    Result Date: 10/12/2019  INDICATION: Intermittent headache dizziness began on Wednesday. Exam: Noncontrast CT of the brain is performed with 5 mm collimation.  CT dose reduction was achieved with the use of the standardized protocol tailored for this examination and automatic exposure control for dose modulation. FINDINGS: There is no acute intracranial hemorrhage, mass, mass effect or herniation. Ventricular system is normal. The gray-white matter differentiation is well-preserved. The mastoid air cells are well pneumatized. The visualized paranasal sinuses are normal.     IMPRESSION: No acute intracranial hemorrhage, mass or infarct.

## 2019-10-12 NOTE — ED TRIAGE NOTES
PT presents to the ER with complaints of intermiitent headache with dizziness that began on Wednesday. PT has been taking motrin with no relief. ADDENDUM: Pt ambulatory, a&ox3. Pt reports neck stiffness. Pt reports being incarcerated and having headaches similar while in skilled nursing.

## 2019-10-12 NOTE — ED PROVIDER NOTES
26-year-old male with no significant medical history presents emergency department with complaints of headache and dizziness x4 days. He has been taking Motrin at home with no relief of his symptoms. He rates his discomfort as a 6 out of 10 in severity. He denies facial asymmetry vision loss ataxia dysarthria nausea vomiting. No other medical complaints at this time. Geovani Hernandez PA-C           Past Medical History:   Diagnosis Date    Migraine        Past Surgical History:   Procedure Laterality Date    HX ORTHOPAEDIC           Family History:   Adopted: Yes       Social History     Socioeconomic History    Marital status: SINGLE     Spouse name: Not on file    Number of children: Not on file    Years of education: Not on file    Highest education level: Not on file   Occupational History    Not on file   Social Needs    Financial resource strain: Not on file    Food insecurity:     Worry: Not on file     Inability: Not on file    Transportation needs:     Medical: Not on file     Non-medical: Not on file   Tobacco Use    Smoking status: Current Every Day Smoker     Packs/day: 1.00    Smokeless tobacco: Never Used   Substance and Sexual Activity    Alcohol use:  Yes     Alcohol/week: 10.0 standard drinks     Types: 10 Cans of beer per week     Comment: socially    Drug use: No    Sexual activity: Yes     Birth control/protection: None   Lifestyle    Physical activity:     Days per week: Not on file     Minutes per session: Not on file    Stress: Not on file   Relationships    Social connections:     Talks on phone: Not on file     Gets together: Not on file     Attends Jewish service: Not on file     Active member of club or organization: Not on file     Attends meetings of clubs or organizations: Not on file     Relationship status: Not on file    Intimate partner violence:     Fear of current or ex partner: Not on file     Emotionally abused: Not on file     Physically abused: Not on file     Forced sexual activity: Not on file   Other Topics Concern    Not on file   Social History Narrative    Not on file         ALLERGIES: Patient has no known allergies. Review of Systems   Constitutional: Negative for chills, diaphoresis and fever. HENT: Negative for congestion, postnasal drip, rhinorrhea and sore throat. Eyes: Negative for photophobia, discharge, redness and visual disturbance. Respiratory: Negative for cough, chest tightness, shortness of breath and wheezing. Cardiovascular: Negative for chest pain, palpitations and leg swelling. Gastrointestinal: Negative for abdominal distention, abdominal pain, blood in stool, constipation, diarrhea, nausea and vomiting. Genitourinary: Negative for difficulty urinating, dysuria, frequency, hematuria and urgency. Musculoskeletal: Negative for arthralgias, back pain, joint swelling and myalgias. Skin: Negative for color change and rash. Neurological: Positive for headaches. Negative for dizziness, speech difficulty, weakness, light-headedness and numbness. Psychiatric/Behavioral: Negative for confusion. The patient is not nervous/anxious. All other systems reviewed and are negative. Vitals:    10/12/19 0749 10/12/19 0755   BP:  150/83   Pulse: (!) 122 93   Resp:  18   Temp:  97.8 °F (36.6 °C)   SpO2: 99% 97%            Physical Exam   Constitutional: He is oriented to person, place, and time. He appears well-developed and well-nourished. No distress. HENT:   Head: Normocephalic and atraumatic. Head is without raccoon's eyes, without Voss's sign and without laceration. Right Ear: Hearing, tympanic membrane, external ear and ear canal normal. No foreign bodies. Tympanic membrane is not bulging. No hemotympanum. Left Ear: Hearing, tympanic membrane, external ear and ear canal normal. No foreign bodies. Tympanic membrane is not bulging. No hemotympanum. Nose: Nose normal. No mucosal edema or rhinorrhea.  Right sinus exhibits no maxillary sinus tenderness and no frontal sinus tenderness. Left sinus exhibits no maxillary sinus tenderness and no frontal sinus tenderness. Mouth/Throat: Uvula is midline, oropharynx is clear and moist and mucous membranes are normal. No tonsillar abscesses. Eyes: Pupils are equal, round, and reactive to light. Conjunctivae and EOM are normal. Right eye exhibits no discharge. Left eye exhibits no discharge. Neck: Normal range of motion. Neck supple. Cardiovascular: Normal rate, regular rhythm and normal heart sounds. Exam reveals no gallop and no friction rub. No murmur heard. Regular rate and rhythm. No murmurs, gallops, rubs, or clicks. Pulmonary/Chest: Effort normal and breath sounds normal. No respiratory distress. He has no wheezes. He has no rales. No stridor or wheezes. No accessory muscle usage. No nasal flaring. Breath Sounds equal bilaterally. Abdominal: Soft. Bowel sounds are normal. He exhibits no distension. There is no tenderness. There is no rebound and no guarding. No abdominal Bruits. No pulsatile mass. No abdominal scars. Active bowel sounds. Musculoskeletal: Normal range of motion. He exhibits no edema, tenderness or deformity. Neurological: He is alert and oriented to person, place, and time. Skin: He is not diaphoretic. Nursing note and vitals reviewed. MDM  Number of Diagnoses or Management Options  Nonintractable headache, unspecified chronicity pattern, unspecified headache type:   Diagnosis management comments: Is afebrile and nontoxic-appearing. No fever neck pain or signs of meningitis. Head CT is negative. Will treat symptomatically and advise close follow-up with his doctor. Patient was also given strict return precautions.   Chapin Wright PA-C         Procedures

## 2019-10-17 ENCOUNTER — OFFICE VISIT (OUTPATIENT)
Dept: HEMATOLOGY | Age: 29
End: 2019-10-17

## 2019-10-17 VITALS
HEART RATE: 83 BPM | OXYGEN SATURATION: 98 % | DIASTOLIC BLOOD PRESSURE: 80 MMHG | SYSTOLIC BLOOD PRESSURE: 118 MMHG | BODY MASS INDEX: 20.64 KG/M2 | TEMPERATURE: 96 F | WEIGHT: 148 LBS

## 2019-10-17 DIAGNOSIS — B18.2 CHRONIC HEPATITIS C WITHOUT HEPATIC COMA (HCC): Primary | ICD-10-CM

## 2019-10-17 NOTE — PROGRESS NOTES
Chief Complaint   Patient presents with    Follow-up     Patient complain of continuous headache for over 1 year now. 1. Have you been to the ER, urgent care clinic since your last visit? Hospitalized since your last visit? No    2. Have you seen or consulted any other health care providers outside of the [address] [place] [address] since your last visit? Include any pap smears or colon screening. No    3 most recent PHQ Screens 1/29/2019   Little interest or pleasure in doing things Not at all   Feeling down, depressed, irritable, or hopeless Not at all   Total Score PHQ 2 0     Abuse Screening Questionnaire 1/29/2019   Do you ever feel afraid of your partner? N   Are you in a relationship with someone who physically or mentally threatens you? N   Is it safe for you to go home?  Y     Learning Assessment 1/29/2019   PRIMARY LEARNER Patient   HIGHEST LEVEL OF EDUCATION - PRIMARY LEARNER  -   BARRIERS PRIMARY LEARNER NONE   CO-LEARNER CAREGIVER No   PRIMARY LANGUAGE ENGLISH   LEARNER PREFERENCE PRIMARY LISTENING   ANSWERED BY patient   RELATIONSHIP SELF     Visit Vitals  /80 (BP 1 Location: Left arm, BP Patient Position: Sitting)   Pulse 83   Temp 96 °F (35.6 °C) (Tympanic)   Wt 148 lb (67.1 kg)   SpO2 98%   BMI 20.64 kg/m²

## 2019-10-17 NOTE — PROGRESS NOTES
[address], MD, Jobe Eaves, Serafina Closs, MD Dalene Kindle, JULI Solis, [facility]     Dedra James, [facility]   LEON Solorzano, [facility]       Jerica Gan [facility] 136    at [address], [facility] Nola Obrien  22.    [phone]    FAX: [address] [place] [address], [address]    [phone]    FAX: [phone]       Patient Care Team:  Telly Steele DO as PCP - General (Family Practice)      Problem List  Date Reviewed: 10/11/2019          Codes Class Noted    Chronic hepatitis C [facility]) ICD-10-CM: B18.2  ICD-9-CM: 070.54  1/29/2019        Chronic tension headaches ICD-10-CM: G01.357  ICD-9-CM: 339.12  1/29/2019            Rosa Reeder returns to the [facility] for management of chronic HCV. The active problem list, all pertinent past medical history, medications, and laboratory findings related to the liver disorder were reviewed with the patient. The patient is a 26 y.o.  male who was found to have abnormalities in liver chemistries and subsequently tested positive for chronic HCV after being incarcerated in 4/2018. Risk factors for acquiring HCV are IV drug use between the years of 6449-9115. He went through detox program while in [facility] and was released in 9/2018, he has been without IVD for ~18 months. Imaging of the liver was not performed. An assessment of liver fibrosis with biopsy or elastography has not been performed as this was not covered by insurance. We did an HCV Fibrosure at the time of his initiation presentation and this was 0.06, consistent with no to minimal fibrosis.      We had obtained approval for HCV medication in 2/2019, patient delayed start until recently out of concern for impact on his employment. Kristen Boxer presents to the office for follow-up of chronic hepatitis C therapy. He has completed 4 weeks of an intended 8 weeks of Mavyret, 9/16/2019 to present. Patient has tolerated therapy reasonably well, he states that he has actually felt better since the start of therapy and there has been no impact on his work schedule. There has not been missed medications. He presents today to monitor response to therapy. The patient completes all daily activities without any functional limitations or symptoms of fatigue. The patient has not experienced problems concentrating, swelling of the abdomen, swelling of the lower extremities, hematemesis, or hematochezia. The patient has no symptoms which can be attributed to the liver disorder. His greatest ongoing concern has been regarding chronic headaches and peripheral neuropathy symptoms that have persisted over a year. He has seen neuro in the past and has had recent presentation to the EMD for evaluation. These have not necessarily changed in character with initiation of HCV medications. ASSESSMENT AND PLAN:  Chronic HCV   Chronic hepatitis C, genotype 1a, in the setting of no to minimal fibrosis (estimated). Kristen Boxer is tolerating medication for treatment of HCV well and has completed 4 weeks of Mavyret, 9/16/2019 to present. Side effects of the current oral treatment have been minimal.  I have reviewed with him our plan to document his on-treatment response to therapy and that this will continue to be monitored following the completion of treatment. Plan to continue daily oral medication for the entire prescribed length of therapy. I have reinforced the importance of adherence to treatment and encouraged the patient to contact this office if new or worsening side effects develop.     Lab values today for monitoring purposes will include basic metabolic panel, hepatic function panel, CBC, and HCV RNA. The patient was directed to continue all current medications at the current dosages. There are no contraindications for the patient to take any medications that are necessary for treatment of other medical issues. Treatment of other medical problems in patients with chronic liver disease  There are no contraindications for the patient to take most medications that are necessary for treatment of other medical issues. Patient may remain on present medications for management of headaches. Counseling for alcohol in patients with chronic liver disease  The patient was counseled regarding alcohol consumption and the effect of alcohol on chronic liver disease. The patient does not consume any significant amount of alcohol. I have asked him to eliminate all alcohol during the time of his HCV therapy. Drug use  The patient was counseled regarding the risk of overdose and death from using narcotic drugs and the risk of becoming reinfected with HCV once they are cured if they resume narcotic drug use. The patient has not used drugs since 4/2018. Vaccinations   Vaccination for viral hepatitis A is recommended since the patient has no serologic evidence of previous exposure or vaccination with immunity. Vaccination for viral hepatitis B is not needed. The patient has serologic evidence of prior exposure or vaccination with immunity. Routine vaccinations against other bacterial and viral agents can be performed as indicated. Annual flu vaccination should be administered if indicated. Headaches  Given the frequency and severity of headaches and possible related peripheral neurologic signs, I have strongly encouraged him to seek additional evaluation with his neurologist. These symptoms pre-dated his use of HCV medications and is not thought to be related.      ALLERGIES  No Known Allergies    MEDICATIONS  Current Outpatient Medications Medication Sig    glecaprevir-pibrentasvir (MAVYRET) 100-40 mg tab Take 3 Tabs by mouth daily.  aspirin-acetaminophen-caffeine (EXCEDRIN ES) 250-250-65 mg per tablet Take 1 Tab by mouth every six (6) hours as needed.  ibuprofen (MOTRIN) 800 mg tablet Take  by mouth every six (6) hours as needed.  cyclobenzaprine (FLEXERIL) 5 mg tablet Take 1 Tab by mouth nightly as needed for Muscle Spasm(s).  topiramate (TOPAMAX) 25 mg tablet 1 tab PO Qhs week 1 then 1 bid week 2 then 2HS 1am week 3 then 2am and HS week 4 and beyond.  amitriptyline (ELAVIL) 25 mg tablet Take 1 Tab by mouth nightly. No current facility-administered medications for this visit. SYSTEM REVIEW NOT RELATED TO LIVER DISEASE OR REVIEWED ABOVE:  Constitution systems: Negative for fever, chills, weight gain, weight loss. Eyes: Negative for visual changes. ENT: Negative for sore throat, painful swallowing. Respiratory: Negative for cough, hemoptysis, SOB. Cardiology: Negative for chest pain, palpitations. GI:  Negative for constipation or diarrhea. : Negative for urinary frequency, dysuria, hematuria, nocturia. Skin: Negative for rash. Hematology: Negative for easy bruising, blood clots. Musculo-skeletal: Negative for back pain, muscle pain, weakness. Neurologic: Negative for dizziness, vertigo, memory problems not related to HE. Positive for headaches. Psychology: Negative for anxiety, depression. FAMILY HISTORY:  The patient is adopted and does not know any of his family history. SOCIAL HISTORY:  The patient has never been . The patient has no children. The patient currently smokes 1 pack of tobacco daily. The patient consumes 3-5 alcoholic beverages per week. The patient currently works full time .       PHYSICAL EXAMINATION:  Visit Vitals  /80 (BP 1 Location: Left arm, BP Patient Position: Sitting)   Pulse 83   Temp 96 °F (35.6 °C) (Tympanic)   Wt 148 lb (67.1 kg)   SpO2 98%   BMI 20.64 kg/m²     General: No acute distress. Eyes: Sclera anicteric. ENT: No oral lesions. Thyroid normal.  Nodes: No adenopathy. Skin: No spider angiomata. No jaundice. No palmar erythema. Respiratory: Lungs clear to auscultation. Cardiovascular: Regular heart rate. No murmurs. No JVD. Abdomen: Soft non-tender. Liver size normal to percussion/palpation. Spleen not palpable. No obvious ascites. Extremities: No edema. No muscle wasting. No gross arthritic changes. Neurologic: Alert and oriented. Cranial nerves grossly intact. No asterixis. LABORATORY STUDIES:  [facility] [place] of [id number]  [address] Units 10/17/2019 1/29/2019   WBC 3.4 - 10.8 x10E3/uL 7.2    ANC 1.4 - 7.0 x10E3/uL     HGB 13.0 - 17.7 g/dL 14.3     - 450 x10E3/uL 318    AST 0 - 40 IU/L 9    ALT 0 - 44 IU/L 8    Alk Phos 39 - 117 IU/L 60    Bili, Total 0.0 - 1.2 mg/dL 0.4 0.3   Bili, Direct 0.00 - 0.40 mg/dL 0.13    Albumin 3.5 - 5.5 g/dL 4.4    BUN 6 - 20 mg/dL 14    Creat 0.76 - 1.27 mg/dL 0.75 (L)    Na 134 - 144 mmol/L 142    K 3.5 - 5.2 mmol/L 4.4    Cl 96 - 106 mmol/L 101    CO2 20 - 29 mmol/L 27    Glucose 65 - 99 mg/dL 78      [facility] [place] of [address] [place] [address] Ref Rng & Units 1/8/2019   WBC 3.4 - 10.8 x10E3/uL 9.5   ANC 1.4 - 7.0 x10E3/uL 6.7   HGB 13.0 - 17.7 g/dL 14.6    - 450 x10E3/uL 298   AST 0 - 40 IU/L 25   ALT 0 - 44 IU/L 39   Alk Phos 39 - 117 IU/L 68   Bili, Total 0.0 - 1.2 mg/dL 0.4   Bili, Direct 0.00 - 0.40 mg/dL    Albumin 3.5 - 5.5 g/dL 4.7   BUN 6 - 20 mg/dL 16   Creat 0.76 - 1.27 mg/dL 0.81   Na 134 - 144 mmol/L 142   K 3.5 - 5.2 mmol/L 4.3   Cl 96 - 106 mmol/L 104   CO2 20 - 29 mmol/L 23   Glucose 65 - 99 mg/dL 88   Additional lab values drawn at today's office visit are pending at the time of documentation.     SEROLOGIES:  Serologies Latest Ref Rng & Units 1/29/2019 1/8/2019   Hep A Ab, Total Negative Negative    Hep B Surface Ag Negative Negative    Hep B Core Ab, Total Negative Negative    Hep B Surface AB QL  Reactive    Hep C Genotype   1a   HCV RT-PCR, Quant IU/mL  1,930,000   HCV Log10 log10 IU/mL  6.286     LIVER HISTOLOGY:  1/2019. HCV Fibrosure is 0.06, consistent with no to minimal fibrosis. ENDOSCOPIC PROCEDURES:  Not available or performed    RADIOLOGY:  Not available or performed    OTHER TESTING:  Not available or performed    FOLLOW-UP:  All of the issues listed above in the Assessment and Plan were discussed with the patient. All questions were answered. The patient expressed a clear understanding of the above. [address] [address] in 2 months, within 4 weeks of completion of HCV therapy.     Tamar Hitchcock PA-C  [facility] [place] of [address] [place] [address], [address] [place] Obrien  22. [phone]

## 2019-10-18 ENCOUNTER — APPOINTMENT (OUTPATIENT)
Dept: GENERAL RADIOLOGY | Age: 29
End: 2019-10-18
Attending: STUDENT IN AN ORGANIZED HEALTH CARE EDUCATION/TRAINING PROGRAM
Payer: COMMERCIAL

## 2019-10-18 ENCOUNTER — APPOINTMENT (OUTPATIENT)
Dept: CT IMAGING | Age: 29
End: 2019-10-18
Attending: STUDENT IN AN ORGANIZED HEALTH CARE EDUCATION/TRAINING PROGRAM
Payer: COMMERCIAL

## 2019-10-18 ENCOUNTER — HOSPITAL ENCOUNTER (EMERGENCY)
Age: 29
Discharge: HOME | End: 2019-10-18
Attending: STUDENT IN AN ORGANIZED HEALTH CARE EDUCATION/TRAINING PROGRAM
Payer: COMMERCIAL

## 2019-10-18 VITALS
HEART RATE: 78 BPM | TEMPERATURE: 98.6 F | RESPIRATION RATE: 16 BRPM | SYSTOLIC BLOOD PRESSURE: 122 MMHG | OXYGEN SATURATION: 100 % | DIASTOLIC BLOOD PRESSURE: 86 MMHG

## 2019-10-18 DIAGNOSIS — R53.81 MALAISE AND FATIGUE: Primary | ICD-10-CM

## 2019-10-18 DIAGNOSIS — G44.229 CHRONIC TENSION-TYPE HEADACHE, NOT INTRACTABLE: ICD-10-CM

## 2019-10-18 DIAGNOSIS — R53.83 MALAISE AND FATIGUE: Primary | ICD-10-CM

## 2019-10-18 LAB
ALBUMIN SERPL-MCNC: 4.4 G/DL (ref 3.5–5.5)
ALBUMIN SERPL-MCNC: 4.7 G/DL (ref 3.5–5)
ALBUMIN/GLOB SERPL: 1.2 {RATIO} (ref 1.1–2.2)
ALP SERPL-CCNC: 60 IU/L (ref 39–117)
ALP SERPL-CCNC: 73 U/L (ref 45–117)
ALT SERPL-CCNC: 17 U/L (ref 12–78)
ALT SERPL-CCNC: 8 IU/L (ref 0–44)
ANION GAP SERPL CALC-SCNC: 7 MMOL/L (ref 5–15)
APPEARANCE UR: CLEAR
AST SERPL-CCNC: 13 U/L (ref 15–37)
AST SERPL-CCNC: 9 IU/L (ref 0–40)
BACTERIA URNS QL MICRO: NEGATIVE /HPF
BASOPHILS # BLD: 0.1 K/UL (ref 0–0.1)
BASOPHILS NFR BLD: 1 % (ref 0–1)
BILIRUB DIRECT SERPL-MCNC: 0.13 MG/DL (ref 0–0.4)
BILIRUB SERPL-MCNC: 0.4 MG/DL (ref 0–1.2)
BILIRUB SERPL-MCNC: 0.7 MG/DL (ref 0.2–1)
BILIRUB UR QL: NEGATIVE
BUN SERPL-MCNC: 13 MG/DL (ref 6–20)
BUN SERPL-MCNC: 14 MG/DL (ref 6–20)
BUN/CREAT SERPL: 15 (ref 12–20)
BUN/CREAT SERPL: 19 (ref 9–20)
CALCIUM SERPL-MCNC: 10.3 MG/DL (ref 8.5–10.1)
CALCIUM SERPL-MCNC: 9.6 MG/DL (ref 8.7–10.2)
CHLORIDE SERPL-SCNC: 101 MMOL/L (ref 96–106)
CHLORIDE SERPL-SCNC: 102 MMOL/L (ref 97–108)
CO2 SERPL-SCNC: 25 MMOL/L (ref 21–32)
CO2 SERPL-SCNC: 27 MMOL/L (ref 20–29)
COLOR UR: ABNORMAL
COMMENT, HOLDF: NORMAL
CREAT SERPL-MCNC: 0.75 MG/DL (ref 0.76–1.27)
CREAT SERPL-MCNC: 0.88 MG/DL (ref 0.7–1.3)
DIFFERENTIAL METHOD BLD: ABNORMAL
EOSINOPHIL # BLD: 0.1 K/UL (ref 0–0.4)
EOSINOPHIL NFR BLD: 0 % (ref 0–7)
EPITH CASTS URNS QL MICRO: ABNORMAL /LPF
ERYTHROCYTE [DISTWIDTH] IN BLOOD BY AUTOMATED COUNT: 11.3 % (ref 11.5–14.5)
ERYTHROCYTE [DISTWIDTH] IN BLOOD BY AUTOMATED COUNT: 11.8 % (ref 12.3–15.4)
ETHANOL SERPL-MCNC: <10 MG/DL
GLOBULIN SER CALC-MCNC: 3.9 G/DL (ref 2–4)
GLUCOSE SERPL-MCNC: 78 MG/DL (ref 65–99)
GLUCOSE SERPL-MCNC: 93 MG/DL (ref 65–100)
GLUCOSE UR STRIP.AUTO-MCNC: NEGATIVE MG/DL
HCT VFR BLD AUTO: 41.7 % (ref 37.5–51)
HCT VFR BLD AUTO: 45 % (ref 36.6–50.3)
HGB BLD-MCNC: 14.3 G/DL (ref 13–17.7)
HGB BLD-MCNC: 15.6 G/DL (ref 12.1–17)
HGB UR QL STRIP: NEGATIVE
HYALINE CASTS URNS QL MICRO: ABNORMAL /LPF (ref 0–5)
IMM GRANULOCYTES # BLD AUTO: 0.1 K/UL (ref 0–0.04)
IMM GRANULOCYTES NFR BLD AUTO: 0 % (ref 0–0.5)
KETONES UR QL STRIP.AUTO: 15 MG/DL
LACTATE SERPL-SCNC: 1.7 MMOL/L (ref 0.4–2)
LEUKOCYTE ESTERASE UR QL STRIP.AUTO: NEGATIVE
LIPASE SERPL-CCNC: 49 U/L (ref 73–393)
LYMPHOCYTES # BLD: 2 K/UL (ref 0.8–3.5)
LYMPHOCYTES NFR BLD: 14 % (ref 12–49)
MCH RBC QN AUTO: 31.4 PG (ref 26.6–33)
MCH RBC QN AUTO: 31.8 PG (ref 26–34)
MCHC RBC AUTO-ENTMCNC: 34.3 G/DL (ref 31.5–35.7)
MCHC RBC AUTO-ENTMCNC: 34.7 G/DL (ref 30–36.5)
MCV RBC AUTO: 91.8 FL (ref 80–99)
MCV RBC AUTO: 92 FL (ref 79–97)
MONOCYTES # BLD: 0.7 K/UL (ref 0–1)
MONOCYTES NFR BLD: 5 % (ref 5–13)
NEUTS SEG # BLD: 11.9 K/UL (ref 1.8–8)
NEUTS SEG NFR BLD: 80 % (ref 32–75)
NITRITE UR QL STRIP.AUTO: NEGATIVE
NRBC # BLD: 0 K/UL (ref 0–0.01)
NRBC BLD-RTO: 0 PER 100 WBC
PH UR STRIP: 8.5 [PH] (ref 5–8)
PLATELET # BLD AUTO: 318 X10E3/UL (ref 150–450)
PLATELET # BLD AUTO: 385 K/UL (ref 150–400)
PMV BLD AUTO: 9.4 FL (ref 8.9–12.9)
POTASSIUM SERPL-SCNC: 3.3 MMOL/L (ref 3.5–5.1)
POTASSIUM SERPL-SCNC: 4.4 MMOL/L (ref 3.5–5.2)
PROT SERPL-MCNC: 8.6 G/DL (ref 6.4–8.2)
PROT UR STRIP-MCNC: NEGATIVE MG/DL
RBC # BLD AUTO: 4.55 X10E6/UL (ref 4.14–5.8)
RBC # BLD AUTO: 4.9 M/UL (ref 4.1–5.7)
RBC #/AREA URNS HPF: ABNORMAL /HPF (ref 0–5)
SAMPLES BEING HELD,HOLD: NORMAL
SODIUM SERPL-SCNC: 134 MMOL/L (ref 136–145)
SODIUM SERPL-SCNC: 142 MMOL/L (ref 134–144)
SP GR UR REFRACTOMETRY: 1.01 (ref 1–1.03)
UR CULT HOLD, URHOLD: NORMAL
UROBILINOGEN UR QL STRIP.AUTO: 0.2 EU/DL (ref 0.2–1)
WBC # BLD AUTO: 14.8 K/UL (ref 4.1–11.1)
WBC # BLD AUTO: 7.2 X10E3/UL (ref 3.4–10.8)
WBC URNS QL MICRO: ABNORMAL /HPF (ref 0–4)

## 2019-10-18 PROCEDURE — 74011250636 HC RX REV CODE- 250/636: Performed by: STUDENT IN AN ORGANIZED HEALTH CARE EDUCATION/TRAINING PROGRAM

## 2019-10-18 PROCEDURE — 83690 ASSAY OF LIPASE: CPT

## 2019-10-18 PROCEDURE — 83605 ASSAY OF LACTIC ACID: CPT

## 2019-10-18 PROCEDURE — 87040 BLOOD CULTURE FOR BACTERIA: CPT

## 2019-10-18 PROCEDURE — 99285 EMERGENCY DEPT VISIT HI MDM: CPT

## 2019-10-18 PROCEDURE — 85025 COMPLETE CBC W/AUTO DIFF WBC: CPT

## 2019-10-18 PROCEDURE — 71045 X-RAY EXAM CHEST 1 VIEW: CPT

## 2019-10-18 PROCEDURE — 74011250637 HC RX REV CODE- 250/637

## 2019-10-18 PROCEDURE — 80307 DRUG TEST PRSMV CHEM ANLYZR: CPT

## 2019-10-18 PROCEDURE — 81001 URINALYSIS AUTO W/SCOPE: CPT

## 2019-10-18 PROCEDURE — 96374 THER/PROPH/DIAG INJ IV PUSH: CPT

## 2019-10-18 PROCEDURE — 74176 CT ABD & PELVIS W/O CONTRAST: CPT

## 2019-10-18 PROCEDURE — 36415 COLL VENOUS BLD VENIPUNCTURE: CPT

## 2019-10-18 PROCEDURE — 93005 ELECTROCARDIOGRAM TRACING: CPT

## 2019-10-18 PROCEDURE — 80053 COMPREHEN METABOLIC PANEL: CPT

## 2019-10-18 RX ORDER — METOCLOPRAMIDE HYDROCHLORIDE 5 MG/ML
10 INJECTION INTRAMUSCULAR; INTRAVENOUS
Status: COMPLETED | OUTPATIENT
Start: 2019-10-18 | End: 2019-10-18

## 2019-10-18 RX ORDER — ACETAMINOPHEN 500 MG
1000 TABLET ORAL ONCE
Status: COMPLETED | OUTPATIENT
Start: 2019-10-18 | End: 2019-10-18

## 2019-10-18 RX ORDER — METOCLOPRAMIDE 10 MG/1
10 TABLET ORAL
Qty: 12 TAB | Refills: 0 | Status: SHIPPED | OUTPATIENT
Start: 2019-10-18 | End: 2019-10-21

## 2019-10-18 RX ORDER — ACETAMINOPHEN 500 MG
TABLET ORAL
Status: COMPLETED
Start: 2019-10-18 | End: 2019-10-18

## 2019-10-18 RX ADMIN — METOCLOPRAMIDE 10 MG: 5 INJECTION, SOLUTION INTRAMUSCULAR; INTRAVENOUS at 14:07

## 2019-10-18 RX ADMIN — Medication 1000 MG: at 14:27

## 2019-10-18 RX ADMIN — SODIUM CHLORIDE 1000 ML: 900 INJECTION, SOLUTION INTRAVENOUS at 11:21

## 2019-10-18 RX ADMIN — ACETAMINOPHEN 1000 MG: 500 TABLET ORAL at 14:27

## 2019-10-18 NOTE — ED TRIAGE NOTES
Triage note: Pt assisted out of his vehicle d/t extremity numbness and contractures. Pt states he has been dealing with this over the last year and it accompanied by neck pain and headaches. Pt states these episodes have become more frequent since last Wednesday.

## 2019-10-18 NOTE — DISCHARGE INSTRUCTIONS
Patient Education        Fatigue: Care Instructions  Your Care Instructions    Fatigue is a feeling of tiredness, exhaustion, or lack of energy. You may feel fatigue because of too much or not enough activity. It can also come from stress, lack of sleep, boredom, and poor diet. Many medical problems, such as viral infections, can cause fatigue. Emotional problems, especially depression, are often the cause of fatigue. Fatigue is most often a symptom of another problem. Treatment for fatigue depends on the cause. For example, if you have fatigue because you have a certain health problem, treating this problem also treats your fatigue. If depression or anxiety is the cause, treatment may help. Follow-up care is a key part of your treatment and safety. Be sure to make and go to all appointments, and call your doctor if you are having problems. It's also a good idea to know your test results and keep a list of the medicines you take. How can you care for yourself at home? · Get regular exercise. But don't overdo it. Go back and forth between rest and exercise. · Get plenty of rest.  · Eat a healthy diet. Do not skip meals, especially breakfast.  · Reduce your use of caffeine, tobacco, and alcohol. Caffeine is most often found in coffee, tea, cola drinks, and chocolate. · Limit medicines that can cause fatigue. This includes tranquilizers and cold and allergy medicines. When should you call for help? Watch closely for changes in your health, and be sure to contact your doctor if:    · You have new symptoms such as fever or a rash.     · Your fatigue gets worse.     · You have been feeling down, depressed, or hopeless. Or you may have lost interest in things that you usually enjoy.     · You are not getting better as expected. Where can you learn more? Go to http://apurva-louise.info/. Enter N769 in the search box to learn more about \"Fatigue: Care Instructions. \"  Current as of: June 26, 2019  Content Version: 12.2  © 5512-6701 [employer]. Care instructions adapted under license by [employer] (which disclaims liability or warranty for this information). If you have questions about a medical condition or this instruction, always ask your healthcare professional. [address] any warranty or liability for your use of this information. Patient Education        Tension Headache: Care Instructions  Your Care Instructions  Most headaches are tension headaches. These headaches tend to happen again, especially if you are under stress. A tension headache may cause pain or a feeling of pressure all over your head. You probably can't pinpoint the center of the pain. If you keep getting tension headaches, the best thing you can do to limit them is to find out what is causing them and then make changes in those areas. Follow-up care is a key part of your treatment and safety. Be sure to make and go to all appointments, and call your doctor if you are having problems. It's also a good idea to know your test results and keep a list of the medicines you take. How can you care for yourself at home? · Rest in a quiet, dark room with a cool cloth on your forehead until your headache is gone. Close your eyes, and try to relax or go to sleep. Don't watch TV or read. Avoid using the computer. · Use a warm, moist towel or a heating pad set on low to relax tight shoulder and neck muscles. · Have someone gently massage your neck and shoulders. · Take pain medicines exactly as directed. ? If the doctor gave you a prescription medicine for pain, take it as prescribed. ? If you are not taking a prescription pain medicine, ask your doctor if you can take an over-the-counter medicine. · Be careful not to take pain medicine more often than the instructions allow, because you may get worse or more frequent headaches when the medicine wears off.   · If you get another tension headache, stop what you are doing and sit quietly for a moment. Close your eyes and breathe slowly. Try to relax your head and neck muscles. · Do not ignore new symptoms that occur with a headache, such as fever, weakness or numbness, vision changes, or confusion. These may be signs of a more serious problem. To help prevent headaches  · Keep a headache diary so you can figure out what triggers your headaches. Avoiding triggers may help you prevent headaches. Record when each headache began, how long it lasted, and what the pain was like (throbbing, aching, stabbing, or dull). List anything that may have triggered the headache, such as being physically or emotionally stressed or being anxious or depressed. Other possible triggers are hunger, anger, fatigue, poor posture, and muscle strain. · Find healthy ways to deal with stress. Headaches are most common during or right after stressful times. Take time to relax before and after you do something that has caused a headache in the past.  · Exercise daily to relieve stress. Relaxation exercises may help reduce tension. · Get plenty of sleep. · Eat regularly and well. Long periods without food can trigger a headache. · Treat yourself to a massage. Some people find that massages are very helpful in relieving tension. · Try to keep your muscles relaxed by keeping good posture. Check your jaw, face, neck, and shoulder muscles for tension, and try to relax them. When sitting at a desk, change positions often, and stretch for 30 seconds each hour. · Reduce eyestrain from computers by blinking frequently and looking away from the computer screen every so often. Make sure you have proper eyewear and that your monitor is set up properly, about an arm's length away. When should you call for help? Call 911 anytime you think you may need emergency care. For example, call if:    · You have signs of a stroke.  These may include:  ? Sudden numbness, paralysis, or weakness in your face, arm, or leg, especially on only one side of your body. ? Sudden vision changes. ? Sudden trouble speaking. ? Sudden confusion or trouble understanding simple statements. ? Sudden problems with walking or balance. ? A sudden, severe headache that is different from past headaches.    Call your doctor now or seek immediate medical care if:    · You have new or worse nausea and vomiting.     · You have a new or higher fever.     · Your headache gets much worse.    Watch closely for changes in your health, and be sure to contact your doctor if:    · You are not getting better after 2 days (48 hours). Where can you learn more? Go to http://apurva-louise.info/. Enter 84 17 85 in the search box to learn more about \"Tension Headache: Care Instructions. \"  Current as of: March 28, 2019  Content Version: 12.2  © 9936-1885 [employer]. Care instructions adapted under license by [employer] (which disclaims liability or warranty for this information). If you have questions about a medical condition or this instruction, always ask your healthcare professional. [address] any warranty or liability for your use of this information.

## 2019-10-18 NOTE — ED PROVIDER NOTES
Patient is a 26-year-old male with a past medical history of migraines and chronic hepatitis C who presents to the emergency department ambulatory with multiple complaints. Overall, he states \"I feel sick. \"Patient cannot pinpoint exactly how this illness began. He denies sick contacts. Main symptoms are continuing headache, nausea, dizziness, malaise, abdominal pain, anxiety. Patient denies alcohol use or drug use recently. Chart review reveals that he was here approximately a week ago for headache and had a unremarkable work-up including a negative head CT. He was seen in the liver [place] yesterday and apparently was at his baseline status then. Headache    Associated symptoms include nausea. Pertinent negatives include no fever, no shortness of breath, no weakness and no vomiting. Numbness   Associated symptoms include headaches and nausea. Pertinent negatives include no shortness of breath, no chest pain, no vomiting and no confusion. Past Medical History:   Diagnosis Date    Migraine        Past Surgical History:   Procedure Laterality Date    HX ORTHOPAEDIC           Family History:   Adopted: Yes       Social History     Socioeconomic History    Marital status: SINGLE     Spouse name: Not on file    Number of children: Not on file    Years of education: Not on file    Highest education level: Not on file   Occupational History    Not on file   Social Needs    Financial resource strain: Not on file    Food insecurity:     Worry: Not on file     Inability: Not on file    Transportation needs:     Medical: Not on file     Non-medical: Not on file   Tobacco Use    Smoking status: Current Every Day Smoker     Packs/day: 1.00    Smokeless tobacco: Never Used   Substance and Sexual Activity    Alcohol use:  Yes     Alcohol/week: 10.0 standard drinks     Types: 10 Cans of beer per week     Comment: socially    Drug use: No    Sexual activity: Yes     Birth control/protection: None   Lifestyle    Physical activity:     Days per week: Not on file     Minutes per session: Not on file    Stress: Not on file   Relationships    Social connections:     Talks on phone: Not on file     Gets together: Not on file     Attends Latter-day service: Not on file     Active member of club or organization: Not on file     Attends meetings of clubs or organizations: Not on file     Relationship status: Not on file    Intimate partner violence:     Fear of current or ex partner: Not on file     Emotionally abused: Not on file     Physically abused: Not on file     Forced sexual activity: Not on file   Other Topics Concern    Not on file   Social History Narrative    Not on file         ALLERGIES: Patient has no known allergies. Review of Systems   Constitutional: Positive for activity change. Negative for chills and fever. HENT: Negative for sore throat. Eyes: Negative for pain. Respiratory: Negative for cough and shortness of breath. Cardiovascular: Negative for chest pain. Gastrointestinal: Positive for abdominal pain and nausea. Negative for vomiting. Genitourinary: Negative for dysuria. Skin: Negative for rash. Neurological: Positive for numbness and headaches. Negative for syncope and weakness. Psychiatric/Behavioral: Negative for confusion. All other systems reviewed and are negative. Vitals:    10/18/19 1024 10/18/19 1122   BP: 129/85 122/86   Pulse: (!) 101 78   Resp: 16 16   Temp: 98.6 °F (37 °C)    SpO2: 98% 100%            Physical Exam   Constitutional: He appears well-developed. No distress. HENT:   Head: Normocephalic and atraumatic. Eyes: Conjunctivae and EOM are normal.   Neck: Normal range of motion. Neck supple. Cardiovascular: Normal rate, regular rhythm and normal heart sounds. Pulmonary/Chest: Effort normal and breath sounds normal. No respiratory distress. Abdominal: Soft. There is no tenderness. There is no guarding.    Musculoskeletal: Normal range of motion. He exhibits no edema. Neurological: He is alert. He exhibits normal muscle tone. Skin: Skin is warm and dry. Psychiatric: His mood appears anxious (and hyperventilating.). Vitals reviewed. MDM       Procedures    Assessment and plan: This is a young male who presents with generalized malaise, likely of one days duration. Wide differential diagnosis at this point, however, exam benign vital signs normal..  Will obtain labs, urine, EKG, CT to narrow the differential.      EKG interpretation: 11:54  Rhythm: normal sinus rhythm; and regular . Rate (approx.): 88; Axis: normal; Intervals: normal ; ST/T wave: normal, no STEMI; EKG documented and interpreted by Teagan Leon MD, ED MD.     The patient is resting comfortably and feels better, is alert, talkative, interactive and in no distress. The repeat examination is unremarkable and benign. The patient is neurologically intact, has a normal mental status and is ambulatory in the ED. The history, exam, diagnostic testing (if any) and the patient's current condition do not suggest pathologic dysrhythmias, stroke, sepsis, ACS, severe anemia, dehydration, or severe electrolyte abnormality or other significant pathology that would warrant further testing, continued ED treatment, admission, neurological consultation, or other specialist evaluation at this point. The vital signs have been stable. The patient's condition is stable and appropriate for discharge. The patient will pursue further outpatient evaluation with the primary care physician or other designated or consulting physician as indicated in the discharge instructions.

## 2019-10-18 NOTE — ED NOTES
Patient has been discharged from the hospital.  He reports he still has a headache despite our efforts here. He will call his mother for a ride. Patient given discharge instructions on neurologist for follow-up care. He verbalizes understanding.

## 2019-10-19 LAB
ATRIAL RATE: 88 BPM
CALCULATED P AXIS, ECG09: 29 DEGREES
CALCULATED R AXIS, ECG10: 51 DEGREES
CALCULATED T AXIS, ECG11: 23 DEGREES
DIAGNOSIS, 93000: NORMAL
P-R INTERVAL, ECG05: 122 MS
Q-T INTERVAL, ECG07: 366 MS
QRS DURATION, ECG06: 84 MS
QTC CALCULATION (BEZET), ECG08: 442 MS
VENTRICULAR RATE, ECG03: 88 BPM

## 2019-10-21 LAB — HCV RNA SERPL QL NAA+PROBE: NEGATIVE

## 2019-10-23 ENCOUNTER — OFFICE VISIT (OUTPATIENT)
Dept: NEUROLOGY | Age: 29
End: 2019-10-23

## 2019-10-23 VITALS
OXYGEN SATURATION: 99 % | BODY MASS INDEX: 20.3 KG/M2 | HEIGHT: 71 IN | DIASTOLIC BLOOD PRESSURE: 88 MMHG | WEIGHT: 145 LBS | SYSTOLIC BLOOD PRESSURE: 130 MMHG | HEART RATE: 93 BPM

## 2019-10-23 DIAGNOSIS — B18.2 CHRONIC HEPATITIS C WITHOUT HEPATIC COMA (HCC): ICD-10-CM

## 2019-10-23 DIAGNOSIS — G44.209 TENSION VASCULAR HEADACHE: Primary | ICD-10-CM

## 2019-10-23 LAB
BACTERIA SPEC CULT: NORMAL
SERVICE CMNT-IMP: NORMAL

## 2019-10-23 RX ORDER — SUMATRIPTAN 100 MG/1
TABLET, FILM COATED ORAL
Refills: 5 | COMMUNITY
Start: 2019-10-11 | End: 2019-10-23

## 2019-10-23 RX ORDER — RIZATRIPTAN BENZOATE 10 MG/1
TABLET, ORALLY DISINTEGRATING ORAL
Qty: 9 TAB | Refills: 2 | Status: SHIPPED | OUTPATIENT
Start: 2019-10-23 | End: 2020-05-04

## 2019-10-23 NOTE — PROGRESS NOTES
Neurology Progress Note    Patient ID:   Radha Lu  [id number]  26 y.o.  1990    Date of Office Visit: 10/23/19    Chief Complaint   Patient presents with    Headache     Interval Hx:     Pt tried Elavil but stopped taking it because it wasn't helping. As PCP discussed with him, goal was to increase the highest tolerable/ effective dose. PCP started him on Topamax on 10-11-19 with tapering up schedule but pt remains on one 25 mg tab/ day, says he can't tolerate it during daytime, only takes it at bedtime. He has seen some response to Topamax so far, not as many severe headaches. Continues to use daily headache pain relievers. Both I and PCP have discussed this will lead to rebound headache. Can't tolerate Sumatriptan, makes him feel awful and headache remains. Brief ROS: as noted above or otherwise negative       Objective:     No Known Allergies    PMHx:  Past Medical History:   Diagnosis Date    Migraine      PSHx:  has a past surgical history that includes hx orthopaedic. Current Outpatient Medications on File Prior to Visit   Medication Sig    ASA/acetaminophen/caffeine/pot (GOODY'S HEADACHE POWDER PO) Take  by mouth.  cyclobenzaprine (FLEXERIL) 5 mg tablet Take 1 Tab by mouth nightly as needed for Muscle Spasm(s).  topiramate (TOPAMAX) 25 mg tablet 1 tab PO Qhs week 1 then 1 bid week 2 then 2HS 1am week 3 then 2am and HS week 4 and beyond.  glecaprevir-pibrentasvir (MAVYRET) 100-40 mg tab Take 3 Tabs by mouth daily.  ibuprofen (MOTRIN) 800 mg tablet Take  by mouth every six (6) hours as needed.  aspirin-acetaminophen-caffeine (EXCEDRIN ES) 250-250-65 mg per tablet Take 1 Tab by mouth every six (6) hours as needed. No current facility-administered medications on file prior to visit.         Physical Exam  Vitals:    10/23/19 0828   BP: 130/88   Pulse: 93   SpO2: 99%   Weight: 65.8 kg (145 lb)   Height: 5' 11\" (1.803 m)       General:   Mental status: Awake, alert, cooperative. Neck: supple Extremities: no edema  Skin: no rashes    Neuro Exam:    CNs:   Facial movements: symmetric. Visual fields; intact in all quadrants, bilateral EOM: conjugate eye movements bilateral  Hearing: normal  Speech: no aphasia, no dysarthria, normal fluency    Motor:  Moves all extremities 5/5/   Coordination: No resting  tremors. Sensory: deferred   Reflexes:  Deferred  Gait: normal    Assessment:       ICD-10-CM ICD-9-CM    1. Tension vascular headache G44.209 307.81    2. Chronic hepatitis C without hepatic coma (HCC) B18.2 070.54      Advised pt to continue working on tapering up on the Topamax at bedtime. Goal is 100 mg QHS or lower-tolerable dose. Discussed common SEFx. Will d/c sumatriptan and add Maxalt 10 mg MLT to use prn migraine. Advised pt to stop taking ALL OTC headache pain relievers. F/u in 2 months.        Signed By: Tylor Fall MD     October 23, 2019

## 2020-01-07 ENCOUNTER — HOSPITAL ENCOUNTER (EMERGENCY)
Age: 30
Discharge: HOME | End: 2020-01-07
Attending: EMERGENCY MEDICINE | Admitting: EMERGENCY MEDICINE
Payer: MEDICAID

## 2020-01-07 VITALS
OXYGEN SATURATION: 97 % | TEMPERATURE: 97.7 F | HEART RATE: 74 BPM | RESPIRATION RATE: 16 BRPM | SYSTOLIC BLOOD PRESSURE: 109 MMHG | DIASTOLIC BLOOD PRESSURE: 66 MMHG

## 2020-01-07 DIAGNOSIS — R51.9 NONINTRACTABLE EPISODIC HEADACHE, UNSPECIFIED HEADACHE TYPE: Primary | ICD-10-CM

## 2020-01-07 LAB
ANION GAP SERPL CALC-SCNC: 7 MMOL/L (ref 5–15)
BASOPHILS # BLD: 0 K/UL (ref 0–0.1)
BASOPHILS NFR BLD: 0 % (ref 0–1)
BUN SERPL-MCNC: 22 MG/DL (ref 6–20)
BUN/CREAT SERPL: 27 (ref 12–20)
CALCIUM SERPL-MCNC: 8.9 MG/DL (ref 8.5–10.1)
CHLORIDE SERPL-SCNC: 105 MMOL/L (ref 97–108)
CO2 SERPL-SCNC: 24 MMOL/L (ref 21–32)
COMMENT, HOLDF: NORMAL
CREAT SERPL-MCNC: 0.82 MG/DL (ref 0.7–1.3)
DIFFERENTIAL METHOD BLD: ABNORMAL
EOSINOPHIL # BLD: 0 K/UL (ref 0–0.4)
EOSINOPHIL NFR BLD: 0 % (ref 0–7)
ERYTHROCYTE [DISTWIDTH] IN BLOOD BY AUTOMATED COUNT: 11.5 % (ref 11.5–14.5)
GLUCOSE SERPL-MCNC: 103 MG/DL (ref 65–100)
HCT VFR BLD AUTO: 39.3 % (ref 36.6–50.3)
HGB BLD-MCNC: 13.2 G/DL (ref 12.1–17)
IMM GRANULOCYTES # BLD AUTO: 0.1 K/UL (ref 0–0.04)
IMM GRANULOCYTES NFR BLD AUTO: 0 % (ref 0–0.5)
LYMPHOCYTES # BLD: 1.4 K/UL (ref 0.8–3.5)
LYMPHOCYTES NFR BLD: 10 % (ref 12–49)
MCH RBC QN AUTO: 30.8 PG (ref 26–34)
MCHC RBC AUTO-ENTMCNC: 33.6 G/DL (ref 30–36.5)
MCV RBC AUTO: 91.6 FL (ref 80–99)
MONOCYTES # BLD: 1.3 K/UL (ref 0–1)
MONOCYTES NFR BLD: 9 % (ref 5–13)
NEUTS SEG # BLD: 11.2 K/UL (ref 1.8–8)
NEUTS SEG NFR BLD: 81 % (ref 32–75)
NRBC # BLD: 0 K/UL (ref 0–0.01)
NRBC BLD-RTO: 0 PER 100 WBC
PLATELET # BLD AUTO: 282 K/UL (ref 150–400)
PMV BLD AUTO: 9.2 FL (ref 8.9–12.9)
POTASSIUM SERPL-SCNC: 3.4 MMOL/L (ref 3.5–5.1)
RBC # BLD AUTO: 4.29 M/UL (ref 4.1–5.7)
SAMPLES BEING HELD,HOLD: NORMAL
SODIUM SERPL-SCNC: 136 MMOL/L (ref 136–145)
WBC # BLD AUTO: 13.9 K/UL (ref 4.1–11.1)

## 2020-01-07 PROCEDURE — 85025 COMPLETE CBC W/AUTO DIFF WBC: CPT

## 2020-01-07 PROCEDURE — 74011250636 HC RX REV CODE- 250/636: Performed by: EMERGENCY MEDICINE

## 2020-01-07 PROCEDURE — 99285 EMERGENCY DEPT VISIT HI MDM: CPT

## 2020-01-07 PROCEDURE — 96374 THER/PROPH/DIAG INJ IV PUSH: CPT

## 2020-01-07 PROCEDURE — 36415 COLL VENOUS BLD VENIPUNCTURE: CPT

## 2020-01-07 PROCEDURE — 96375 TX/PRO/DX INJ NEW DRUG ADDON: CPT

## 2020-01-07 PROCEDURE — 80048 BASIC METABOLIC PNL TOTAL CA: CPT

## 2020-01-07 RX ORDER — DIPHENHYDRAMINE HYDROCHLORIDE 50 MG/ML
25 INJECTION, SOLUTION INTRAMUSCULAR; INTRAVENOUS
Status: COMPLETED | OUTPATIENT
Start: 2020-01-07 | End: 2020-01-07

## 2020-01-07 RX ORDER — MECLIZINE HCL 12.5 MG 12.5 MG/1
25 TABLET ORAL
Status: COMPLETED | OUTPATIENT
Start: 2020-01-07 | End: 2020-01-07

## 2020-01-07 RX ORDER — DEXAMETHASONE SODIUM PHOSPHATE 10 MG/ML
10 INJECTION INTRAMUSCULAR; INTRAVENOUS ONCE
Status: COMPLETED | OUTPATIENT
Start: 2020-01-07 | End: 2020-01-07

## 2020-01-07 RX ORDER — METOCLOPRAMIDE HYDROCHLORIDE 5 MG/ML
10 INJECTION INTRAMUSCULAR; INTRAVENOUS
Status: COMPLETED | OUTPATIENT
Start: 2020-01-07 | End: 2020-01-07

## 2020-01-07 RX ADMIN — MECLIZINE 25 MG: 12.5 TABLET ORAL at 10:35

## 2020-01-07 RX ADMIN — SODIUM CHLORIDE 1000 ML: 900 INJECTION, SOLUTION INTRAVENOUS at 12:27

## 2020-01-07 RX ADMIN — METOCLOPRAMIDE 10 MG: 5 INJECTION, SOLUTION INTRAMUSCULAR; INTRAVENOUS at 12:27

## 2020-01-07 RX ADMIN — DEXAMETHASONE SODIUM PHOSPHATE 10 MG: 10 INJECTION, SOLUTION INTRAMUSCULAR; INTRAVENOUS at 12:27

## 2020-01-07 RX ADMIN — DIPHENHYDRAMINE HYDROCHLORIDE 25 MG: 50 INJECTION, SOLUTION INTRAMUSCULAR; INTRAVENOUS at 12:27

## 2020-01-07 NOTE — ED PROVIDER NOTES
Date of Service:  1/7/2020    Patient:  Caleb Martines    Chief Complaint:  Back Pain and Headache       HPI:  Caleb Martines is a 26 y.o.  male who presents for evaluation of dizziness. Pt states he generally feels unwell, and states specific sxs of dizziness and diarrhea. He states he feels like he cannot thinks straight, but is unable to articulate the details of this feeling. After prompting he does states he has had issues with dizziness every day, and this is an ongoing problem. He states episodes occur intermittently and he has currently felt dizzy since 0400 today. He states it feels like the room is spinning, he sometimes feels faint during episodes, and episodes are accompanied by neck stiffness. Pt also endorses 5 episodes of diarrhea, and difficulty sleeping last night. He states he feels better currently than he did on arrival, but just feels like he can't think straight. He has seen a neurologist in the past who prescribed him migraine medication, but he states he does not have true migraines, only occasional headaches, but he does not have a headache currently. He specifically denies fever, suicidal ideation, homicidal ideation, nausea, and vomiting. Social hx:endorses tobacco smoking (1 pack per day); endorses social EtOH use; denies illicit drug use. PCP: Lynda Apodaca DO    Full history, physical exam, and ROS unable to be obtained due to:  Pt unable to properly explain his sxs. Note written by Demetrius Mendez, as dictated by Shanika Benavides DO 10:25 AM        The history is provided by the patient. No  was used.         Past Medical History:   Diagnosis Date    Migraine        Past Surgical History:   Procedure Laterality Date    HX ORTHOPAEDIC           Family History:   Adopted: Yes       Social History     Socioeconomic History    Marital status: SINGLE     Spouse name: Not on file    Number of children: Not on file    Years of education: Not on file    Highest education level: Not on file   Occupational History    Not on file   Social Needs    Financial resource strain: Not on file    Food insecurity:     Worry: Not on file     Inability: Not on file    Transportation needs:     Medical: Not on file     Non-medical: Not on file   Tobacco Use    Smoking status: Current Every Day Smoker     Packs/day: 1.00    Smokeless tobacco: Never Used   Substance and Sexual Activity    Alcohol use: Yes     Alcohol/week: 10.0 standard drinks     Types: 10 Cans of beer per week     Comment: socially    Drug use: No    Sexual activity: Yes     Birth control/protection: None   Lifestyle    Physical activity:     Days per week: Not on file     Minutes per session: Not on file    Stress: Not on file   Relationships    Social connections:     Talks on phone: Not on file     Gets together: Not on file     Attends [employer] service: Not on file     Active member of club or organization: Not on file     Attends meetings of clubs or organizations: Not on file     Relationship status: Not on file    Intimate partner violence:     Fear of current or ex partner: Not on file     Emotionally abused: Not on file     Physically abused: Not on file     Forced sexual activity: Not on file   Other Topics Concern    Not on file   Social History Narrative    Not on file         ALLERGIES: Patient has no known allergies. Review of Systems   Constitutional: Negative for fever. HENT: Negative for hearing loss. Eyes: Negative for visual disturbance. Respiratory: Negative for shortness of breath. Cardiovascular: Negative for chest pain. Gastrointestinal: Positive for diarrhea. Negative for abdominal pain, nausea and vomiting. Genitourinary: Negative for flank pain. Musculoskeletal: Negative for back pain. Skin: Negative for rash. Neurological: Positive for dizziness and headaches. Negative for light-headedness.    Psychiatric/Behavioral: Positive for sleep disturbance. Negative for suicidal ideas. Vitals:    01/07/20 0916   BP: 123/71   Pulse: 74   Resp: 16   Temp: 97.7 °F (36.5 °C)   SpO2: 98%            Physical Exam  Vitals signs and nursing note reviewed. Constitutional:       General: He is not in acute distress. Appearance: He is well-developed. HENT:      Head: Normocephalic and atraumatic. Eyes:      Conjunctiva/sclera: Conjunctivae normal.      Pupils: Pupils are equal, round, and reactive to light. Neck:      Musculoskeletal: Neck supple. Vascular: No JVD. Trachea: No tracheal deviation. Cardiovascular:      Rate and Rhythm: Normal rate and regular rhythm. Heart sounds: No murmur. No friction rub. Pulmonary:      Effort: Pulmonary effort is normal. No respiratory distress. Breath sounds: Normal breath sounds. Abdominal:      General: There is no distension. Palpations: Abdomen is soft. Tenderness: There is no tenderness. Musculoskeletal:         General: No tenderness or deformity. Skin:     General: Skin is warm and dry. Capillary Refill: Capillary refill takes less than 2 seconds. Findings: No rash. Neurological:      General: No focal deficit present. Mental Status: He is alert and oriented to person, place, and time. Mental status is at baseline. Cranial Nerves: No cranial nerve deficit. Motor: No weakness. Gait: Gait normal.      Note written by Demetrius Li, as dictated by Prasad Marcelo DO 10:34 AM    MDM  Number of Diagnoses or Management Options  Nonintractable episodic headache, unspecified headache type:     ED Course as of Jan 07 1554   Tue Jan 07, 2020   1208 Pt.  Now has a headache    [GG]      ED Course User Index  [GG] Prasad Marcelo DO     VITAL SIGNS:  Patient Vitals for the past 4 hrs:   BP SpO2   01/07/20 1330 109/66 97 %   01/07/20 1300 107/67 94 %   01/07/20 1230 118/75 98 %         LABS:  Recent Results (from the past 6 hour(s))   CBC WITH AUTOMATED DIFF    Collection Time: 01/07/20 10:33 AM   Result Value Ref Range    WBC 13.9 (H) 4.1 - 11.1 K/uL    RBC 4.29 4. 10 - 5.70 M/uL    HGB 13.2 12.1 - 17.0 g/dL    HCT 39.3 36.6 - 50.3 %    MCV 91.6 80.0 - 99.0 FL    MCH 30.8 26.0 - 34.0 PG    MCHC 33.6 30.0 - 36.5 g/dL    RDW 11.5 11.5 - 14.5 %    PLATELET 989 461 - 924 K/uL    MPV 9.2 8.9 - 12.9 FL    NRBC 0.0 0  WBC    ABSOLUTE NRBC 0.00 0.00 - 0.01 K/uL    NEUTROPHILS 81 (H) 32 - 75 %    LYMPHOCYTES 10 (L) 12 - 49 %    MONOCYTES 9 5 - 13 %    EOSINOPHILS 0 0 - 7 %    BASOPHILS 0 0 - 1 %    IMMATURE GRANULOCYTES 0 0.0 - 0.5 %    ABS. NEUTROPHILS 11.2 (H) 1.8 - 8.0 K/UL    ABS. LYMPHOCYTES 1.4 0.8 - 3.5 K/UL    ABS. MONOCYTES 1.3 (H) 0.0 - 1.0 K/UL    ABS. EOSINOPHILS 0.0 0.0 - 0.4 K/UL    ABS. BASOPHILS 0.0 0.0 - 0.1 K/UL    ABS. IMM. GRANS. 0.1 (H) 0.00 - 0.04 K/UL    DF AUTOMATED     METABOLIC PANEL, BASIC    Collection Time: 01/07/20 10:33 AM   Result Value Ref Range    Sodium 136 136 - 145 mmol/L    Potassium 3.4 (L) 3.5 - 5.1 mmol/L    Chloride 105 97 - 108 mmol/L    CO2 24 21 - 32 mmol/L    Anion gap 7 5 - 15 mmol/L    Glucose 103 (H) 65 - 100 mg/dL    BUN 22 (H) 6 - 20 MG/DL    Creatinine 0.82 0.70 - 1.30 MG/DL    BUN/Creatinine ratio 27 (H) 12 - 20      GFR est AA >60 >60 ml/min/1.73m2    GFR est non-AA >60 >60 ml/min/1.73m2    Calcium 8.9 8.5 - 10.1 MG/DL   SAMPLES BEING HELD    Collection Time: 01/07/20 10:48 AM   Result Value Ref Range    SAMPLES BEING HELD 1RD,1BLU     COMMENT        Add-on orders for these samples will be processed based on acceptable specimen integrity and analyte stability, which may vary by analyte.         IMAGING:  No orders to display         Medications During Visit:  Medications   meclizine (ANTIVERT) tablet 25 mg (25 mg Oral Given 1/7/20 1035)   dexamethasone (PF) (DECADRON) injection 10 mg (10 mg IntraVENous Given 1/7/20 1227)   diphenhydrAMINE (BENADRYL) injection 25 mg (25 mg IntraVENous Given 1/7/20 1227)   metoclopramide HCl (REGLAN) injection 10 mg (10 mg IntraVENous Given 1/7/20 1227)   sodium chloride 0.9 % bolus infusion 1,000 mL (0 mL IntraVENous IV Completed 1/7/20 1339)         DECISION MAKING:  Maximino Gaona is a 26 y.o. male who comes in as above. Here, patient's physical exam is unremarkable. He later developed a headache which is minimally resolved with medications provided. At this time patient shows no acute distress and has no acute findings. Patient will be discharged home with outpatient follow-up and return instructions. Patient stable at discharge      IMPRESSION:  1. Nonintractable episodic headache, unspecified headache type        DISPOSITION:  Discharged      Discharge Medication List as of 1/7/2020  1:17 PM           Follow-up Information     Follow up With Specialties Details Why Contact Info    Shivani Campbell DO Family [facility] Schedule an appointment as soon as possible for a visit   [address]  [address] [place] [address] [phone]              The patient is asked to follow-up with their primary care provider in the next several days. They are to call tomorrow for an appointment. The patient is asked to return promptly for any increased concerns or worsening of symptoms. They can return to this emergency department or any other emergency department.       Procedures

## 2020-01-07 NOTE — ED TRIAGE NOTES
Pt arrives via squad with c/o mid/upper back pain, stiff neck and migraine for awhile, pt with a list of complaints on his phone, pt stated he has been seeing a neurologist, pt rambling in triage with complaints , he stated he can not get a doctor to feel like they are interested in him, +dizziness when turning his head

## 2020-05-01 ENCOUNTER — TELEPHONE (OUTPATIENT)
Dept: FAMILY MEDICINE CLINIC | Age: 30
End: 2020-05-01

## 2020-05-01 NOTE — TELEPHONE ENCOUNTER
Richard Certain the home health nurse calling and she is at pt home today to start home health visits and states that his blood pressure is 141/105 and pulse at 95. She states he has a virtual appt for 5/4 with nurse practioner  but need to make office aware of elevated blood pressure and he is not on any bp meds at present. She can be reached at [phone]. Did not know if provider wanted to start bp meds prior to being seen.

## 2020-05-04 ENCOUNTER — VIRTUAL VISIT (OUTPATIENT)
Dept: FAMILY MEDICINE CLINIC | Age: 30
End: 2020-05-04

## 2020-05-04 DIAGNOSIS — F32.A ANXIETY AND DEPRESSION: ICD-10-CM

## 2020-05-04 DIAGNOSIS — G91.9 HYDROCEPHALUS IN ADULT (HCC): ICD-10-CM

## 2020-05-04 DIAGNOSIS — R03.0 ELEVATED BP WITHOUT DIAGNOSIS OF HYPERTENSION: ICD-10-CM

## 2020-05-04 DIAGNOSIS — Z78.9 IMPAIRED MOBILITY AND ADLS: ICD-10-CM

## 2020-05-04 DIAGNOSIS — Z74.09 IMPAIRED MOBILITY AND ADLS: ICD-10-CM

## 2020-05-04 DIAGNOSIS — F41.9 ANXIETY AND DEPRESSION: ICD-10-CM

## 2020-05-04 DIAGNOSIS — B37.5: Primary | ICD-10-CM

## 2020-05-04 DIAGNOSIS — Z86.73 HISTORY OF CVA (CEREBROVASCULAR ACCIDENT): ICD-10-CM

## 2020-05-04 RX ORDER — SERTRALINE HYDROCHLORIDE 50 MG/1
50 TABLET, FILM COATED ORAL DAILY
Qty: 30 TAB | Refills: 2 | Status: SHIPPED | OUTPATIENT
Start: 2020-05-04 | End: 2020-08-03 | Stop reason: SDUPTHER

## 2020-05-04 NOTE — PROGRESS NOTES
Consent: Lori De Leon, who was seen by synchronous (real-time) audio-video technology, and/or his healthcare decision maker, is aware that this patient-initiated, Telehealth encounter on 5/4/2020 is a billable service, with coverage as determined by his insurance carrier. He is aware that he may receive a bill and has provided verbal consent to proceed: Yes. 712      Subjective:   Lori De Leon is a 26 y.o. male who was seen for [place] [id number] with patient and mom for virtual visit. Per [facility] notes \"Pt is 27 yo WM with pmhx significant for migraines, hepatitis C,anxiety, ADHD, IVDU and cocaine who was admitted on 1/18 for worsening headaches and found to have to have fungal meningitis growing Candida. Patient was started on amphotericin B and Flucytosine at that time. Patient additionally had lumbar drain placed on 1/25. As of 1/27 patient had nonfocal neurological exam and full strength and sensation. Patient acutely decompensated at 23:00 on 1/27 and RRT was called. Patient had an episode of emesis and was subsequently unresponsive and had oxygen desaturations into the 70s. Patient was intubated at that time. He was briefly on Levophed for hypotension. Patient was noted to have unreactive pupils b/l and NCHCT showed worsening hydrocephalus. Decision was made by neurosurgery to place EVD with initial ICP of 35. On 1/28 patient was found ischemic strokes in the cervical and medullary junction as well as medulla and b/l cerebellar strokes. He also had Trach on 2/5/2020. On 2/10 he had a CT with bibasilar consolidations, concerning for hospital acquired pneumonia. On 2/13 he had a PEG placed by GI. On 2/15 interval MRIs of brain and neuraxis showed subacute infarct in the corpus callosum and epidural abscess w/spinal cord compression at level of C5/C6. On 2/16 a PICC line was started for long term antimicrobials. On 2/20 patient had dropplers which were negative.  On 2/21 patient a  shunt placement by Dr. Chepe Fuentes. CT from 2/22 showing decreased ventricular size. Patient currently communicating by mouthing words and shaking head yes/no. He was evaluated by PT/OT for who recommended SCI rehab. He was admitted to [facility] rehab on 2/28/2020\"  Pt was discharged from [facility] rehab on 4/30/2020. Pt with continued functional deficits s/p candida dubliniensis meningitis c/b hydrocephalus and increased intracranial pressures resulting in cerebellar tonsillar herniation with acute infarction of the lower medulla and upper cervical spinal cord as well as the inferior medial cerebellum. He was discharged with home health, PT/OT, speech. Has hospital bed, motorized wheelchair, manual concepcion lift. Family provides assistance at home. Mom states that [facility] has come once and BP was elevated at 141/105, has not returned yet for recheck. Mom believes that [facility] is supposed to come 2 X week. Mom believes that PT/OT and speech are supposed to come today. Patient w/ fine motor movement of right arm but unable to complete gross motor movements with FORTUNATO CANCHOLA extremely weak, cannot move fingers. Has gross motor movement of both legs. Pt requires 1 person assist into wheelchair, states she gets up into wheelchair daily for approx 2 hours. He requires assistance with ADLs including eating, bathing, dressing, turning in bed. Has assistance from family, mom states they have to wake him for turns in middle of night. Mom reports pt is on full diet, no restrictions, able to swallow pills. He has closed cath system and bowel regimen. Follow ups planned:  -Opthalmology is 1 week  -Infectious disease (still on PO fluconazole for meningitis)  -PM&R  -MRI mid May    Mom also concerned about pts mental health, concern for anxiety and depression given new diagnoses and life changes. Pt agrees and would like to try medication for anxiety and mood. Prior to Admission medications    Medication Sig Start Date End Date Taking? Authorizing Provider   sertraline (ZOLOFT) 50 mg tablet Take 1 Tab by mouth daily. 5/4/20  Yes Lachelle Ordoñez NP   ASA/acetaminophen/caffeine/pot (GOODY'S HEADACHE POWDER PO) Take  by mouth. Provider, Historical   rizatriptan (MAXALT-MLT) 10 mg disintegrating tablet 1 tab at onset of migraine. Limit use to 2 days per week. 30 Day Rx 10/23/19 5/4/20  Steven Rivero MD   cyclobenzaprine (FLEXERIL) 5 mg tablet Take 1 Tab by mouth nightly as needed for Muscle Spasm(s). 10/11/19   Darrell Campbell, DO   topiramate (TOPAMAX) 25 mg tablet 1 tab PO Qhs week 1 then 1 bid week 2 then 2HS 1am week 3 then 2am and HS week 4 and beyond. 10/11/19   Foster Campbell,    glecaprevir-pibrentasvir (MAVYRET) 100-40 mg tab Take 3 Tabs by mouth daily. 1/29/19   PRAVEEN Casey   aspirin-acetaminophen-caffeine (EXCEDRIN ES) 807-744-62 mg per tablet Take 1 Tab by mouth every six (6) hours as needed. Provider, Historical   ibuprofen (MOTRIN) 800 mg tablet Take  by mouth every six (6) hours as needed. Provider, Historical     No Known Allergies    Patient Active Problem List    Diagnosis Date Noted    Impaired mobility and ADLs 05/04/2020    Chronic hepatitis C ([facility] [address] 01/29/2019    Chronic tension headaches 01/29/2019       ROS - negative except as listed above in the HPI      Objective:   Vital Signs: (As obtained by patient/caregiver at home)  There were no vitals taken for this visit.      Physical Exam:   General: alert, cooperative, no distress   Mental  status: normal mood, behavior, speech, dress, and thought processes, able to follow commands, decreased motor activity LUE   HEENT: normocephalic, atraumatic    Eyes:  extraocular movements intact, sclera normal, no visible discharge   Ears:  external ears normal   Mouth/Throat:  mucous memrates appear moist    Neck: no visualized mass   Resp: respiratory effort is normal, breathing appears non-labored, no visualized signs of respiratory distress   Neuro: no gross deficits   Skin: no discoloration or lesions of concern on visible areas   MSK: fine motor movement of RUE/fingers but no gross motor movement, no fine or gross motor movement of LUE, gross movement of BLE, decreased strength throughout all extremities    Psychiatric: normal affect, consistent with stated mood, no evidence of hallucinations      Additional exam findings:      Assessment & Plan:   Diagnoses and all orders for this visit:    1. Candidal meningitis  2. Hydrocephalus in adult [facility])  - Continue PO fluconazole  - Keep follow up visits planned with ID and MRI   - S/P right  shunt    3. Anxiety and depression  -     sertraline (ZOLOFT) 50 mg tablet; Take 1 Tab by mouth daily.  - New Rx, advised on use and SE/ADRs  - F/U 1 month    4. History of CVA (cerebrovascular accident)  5. Impaired mobility and ADLs  - Continue HH, PT/OT, and speech  - Keep f/u planned with PM&R    6. Elevated BP without diagnosis of hypertension  - Continue to check with [facility] and notify if persistently elevated   - Was not on BP med during hospitalization, one documented elevated BP at 141/015        Follow-up and Dispositions    · Return in about 1 month (around 6/4/2020). I spent at least 25 minutes with this established patient, and >50% of the time was spent counseling and/or coordinating care regarding hospitalization for candidal meningitis causing hydrocephalus and ischemic CVA, now with functional defecits requiring HH, PT/OT, speech therapy, anxiety and depression. We discussed the expected course, resolution and complications of the diagnosis(es) in detail. Medication risks, benefits, costs, interactions, and alternatives were discussed as indicated. I advised him to contact the office if his condition worsens, changes or fails to improve as anticipated. He expressed understanding with the diagnosis(es) and plan.        Deena Silva is a 26 y.o. male being evaluated by a video visit encounter for concerns as above. A caregiver was present when appropriate. Due to this being a TeleHealth encounter (During URSFX-61 public health emergency), evaluation of the following organ systems was limited: Vitals/Constitutional/EENT/Resp/CV/GI//MS/Neuro/Skin/Heme-Lymph-Imm. Pursuant to the emergency declaration under the [address] [place] [address] waiver authority and the [employer] Act, this Virtual  Visit was conducted, with patient's (and/or legal guardian's) consent, to reduce the patient's risk of exposure to COVID-19 and provide necessary medical care. Services were provided through a video synchronous discussion virtually to substitute for in-person clinic visit. Patient and provider were located at their individual homes.         Jaycee Opitz, NP

## 2020-05-18 ENCOUNTER — DOCUMENTATION ONLY (OUTPATIENT)
Dept: FAMILY MEDICINE CLINIC | Age: 30
End: 2020-05-18

## 2020-05-20 ENCOUNTER — DOCUMENTATION ONLY (OUTPATIENT)
Dept: FAMILY MEDICINE CLINIC | Age: 30
End: 2020-05-20

## 2020-05-20 NOTE — PROGRESS NOTES
Faxed completed CMN for incontinence supplies, received confirmation fax received.  Confirmation #[id number]

## 2020-05-29 DIAGNOSIS — R25.2 SPASTICITY: Primary | ICD-10-CM

## 2020-05-29 RX ORDER — DANTROLENE SODIUM 50 MG/1
CAPSULE ORAL
Qty: 240 CAP | Refills: 5 | Status: SHIPPED | OUTPATIENT
Start: 2020-05-29 | End: 2021-02-08 | Stop reason: SDUPTHER

## 2020-05-29 RX ORDER — DANTROLENE SODIUM 50 MG/1
CAPSULE ORAL
COMMUNITY
Start: 2020-04-30 | End: 2020-05-29 | Stop reason: SDUPTHER

## 2020-06-02 ENCOUNTER — TELEPHONE (OUTPATIENT)
Dept: FAMILY MEDICINE CLINIC | Age: 30
End: 2020-06-02

## 2020-06-02 NOTE — TELEPHONE ENCOUNTER
Sending to nurse for more informain due to there are messages in chart but nothing about this problem and pt has virtual appt setup already for 6/5.

## 2020-06-02 NOTE — TELEPHONE ENCOUNTER
----- Message from Sunny Barry sent at 6/2/2020  9:51 AM EDT -----  Regarding: [place]/telephone  Pt is requesting to know if the Rx for his UTI has been called in. He stated a home health nurse was at his house last week and he does a UTI. Pts number is [phone] and [facility] number is [phone].

## 2020-06-02 NOTE — TELEPHONE ENCOUNTER
Returned pt's call. No answer, left voicemail to return call. Pt did not mention anything about Rx for UTI.

## 2020-06-05 ENCOUNTER — VIRTUAL VISIT (OUTPATIENT)
Dept: FAMILY MEDICINE CLINIC | Age: 30
End: 2020-06-05

## 2020-08-03 DIAGNOSIS — F41.9 ANXIETY AND DEPRESSION: ICD-10-CM

## 2020-08-03 DIAGNOSIS — F32.A ANXIETY AND DEPRESSION: ICD-10-CM

## 2020-08-03 RX ORDER — SERTRALINE HYDROCHLORIDE 50 MG/1
50 TABLET, FILM COATED ORAL DAILY
Qty: 30 TAB | Refills: 2 | Status: SHIPPED | OUTPATIENT
Start: 2020-08-03 | End: 2020-08-18 | Stop reason: SDUPTHER

## 2020-08-18 ENCOUNTER — TELEPHONE (OUTPATIENT)
Dept: FAMILY MEDICINE CLINIC | Age: 30
End: 2020-08-18

## 2020-08-18 DIAGNOSIS — F41.9 ANXIETY AND DEPRESSION: ICD-10-CM

## 2020-08-18 DIAGNOSIS — F32.A ANXIETY AND DEPRESSION: ICD-10-CM

## 2020-08-18 RX ORDER — SERTRALINE HYDROCHLORIDE 50 MG/1
50 TABLET, FILM COATED ORAL DAILY
Qty: 30 TAB | Refills: 2 | Status: SHIPPED | OUTPATIENT
Start: 2020-08-18

## 2020-08-18 NOTE — TELEPHONE ENCOUNTER
Patients aunt kailyn called and states that [facility] said they did not receive the Zoloft prescription sent on 08/03/2020. Their system was down. She would like to have it resent. Please send to pharmacy.

## 2020-08-27 ENCOUNTER — VIRTUAL VISIT (OUTPATIENT)
Dept: FAMILY MEDICINE CLINIC | Age: 30
End: 2020-08-27
Payer: MEDICAID

## 2020-08-27 DIAGNOSIS — F32.A ANXIETY AND DEPRESSION: ICD-10-CM

## 2020-08-27 DIAGNOSIS — F41.9 ANXIETY AND DEPRESSION: ICD-10-CM

## 2020-08-27 DIAGNOSIS — N48.9 PENILE LESION: ICD-10-CM

## 2020-08-27 DIAGNOSIS — G47.00 INSOMNIA, UNSPECIFIED TYPE: Primary | ICD-10-CM

## 2020-08-27 PROCEDURE — 99213 OFFICE O/P EST LOW 20 MIN: CPT | Performed by: NURSE PRACTITIONER

## 2020-08-27 RX ORDER — TRAZODONE HYDROCHLORIDE 100 MG/1
100 TABLET ORAL
Qty: 30 TAB | Refills: 5 | Status: SHIPPED | OUTPATIENT
Start: 2020-08-27 | End: 2020-09-16 | Stop reason: SDUPTHER

## 2020-08-27 NOTE — PROGRESS NOTES
Consent: Chris Churchill, who was seen by synchronous (real-time) audio-video technology, and/or his healthcare decision maker, is aware that this patient-initiated, Telehealth encounter on 8/27/2020 is a billable service, with coverage as determined by his insurance carrier. He is aware that he may receive a bill and has provided verbal consent to proceed: Yes. 712      Subjective:   Chris Churchill is a 31 y.o. male who was seen for Medication Evaluation (patient has a virtual appointment to go over medications)  Getting stronger since discharge from hospital / rehab  Can stand up own now, can walk w/ walker, still has some balance issues   For the past few weeks has not been sleeping as well   Wakes up and feels like he has to stretch legs  Currently on trazodone 50mg and this initially worked well for him    Mom notes he has been w/o zoloft for last 2 weeks. States \"my mood has been okay\"   Denies change in motivation or interest     Has bump on penis for the past 1.5-2 weeks   States it is located the shaft below the head   Describes as red, raised, smaller than pencil eraser, states it doesn't look like pimple   No recent sexual activity   Denies associated pain, swelling, surrounding redness, or purulence    Prior to Admission medications    Medication Sig Start Date End Date Taking? Authorizing Provider   traZODone (DESYREL) 100 mg tablet Take 1 Tab by mouth nightly. 8/27/20  Yes Joselyn FISHER, NP   sertraline (ZOLOFT) 50 mg tablet Take 1 Tab by mouth daily. 8/18/20  Yes Frandy Colindres, NP   dantrolene (DANTRIUM) 50 mg capsule TAKE TWO CAPSULES BY MOUTH FOUR TIMES A DAY 5/29/20  Yes Ana Durham NP   ASA/acetaminophen/caffeine/pot (GOODY'S HEADACHE POWDER PO) Take  by mouth. Yes Provider, Historical   cyclobenzaprine (FLEXERIL) 5 mg tablet Take 1 Tab by mouth nightly as needed for Muscle Spasm(s).  10/11/19  Yes Darrell Campbell,    topiramate (TOPAMAX) 25 mg tablet 1 tab PO Qhs week 1 then 1 bid week 2 then 2HS 1am week 3 then 2am and HS week 4 and beyond. 10/11/19  Yes Darrell Campbell,    glecaprevir-pibrentasvir (MAVYRET) 100-40 mg tab Take 3 Tabs by mouth daily. 1/29/19  Yes PRAVEEN Contreras   aspirin-acetaminophen-caffeine (EXCEDRIN ES) 554-003-49 mg per tablet Take 1 Tab by mouth every six (6) hours as needed. Yes Provider, Historical   ibuprofen (MOTRIN) 800 mg tablet Take  by mouth every six (6) hours as needed. Yes Provider, Historical     No Known Allergies    Patient Active Problem List    Diagnosis Date Noted    Impaired mobility and ADLs 05/04/2020    Chronic hepatitis C ([address] 01/29/2019    Chronic tension headaches 01/29/2019       ROS - negative except as listed above in the HPI      Objective:   Vital Signs: (As obtained by patient/caregiver at home)  There were no vitals taken for this visit. Physical Exam:   General: alert, cooperative, no distress   Mental  status: normal mood, behavior, speech, dress, motor activity, and thought processes, able to follow commands   HEENT: normocephalic, atraumatic    Eyes:  extraocular movements intact, sclera normal, no visible discharge   Ears:  external ears normal   Mouth/Throat:  mucous memrates appear moist    Neck: no visualized mass   Resp: respiratory effort is normal, breathing appears non-labored, no visualized signs of respiratory distress   Neuro: no gross deficits   Skin: no discoloration or lesions of concern on visible areas   Psychiatric: normal affect, consistent with stated mood, no evidence of hallucinations      Additional exam findings: pt not comfortable w/ showing lesion on penis today     Assessment & Plan:   Diagnoses and all orders for this visit:    1. Insomnia, unspecified type  -     traZODone (DESYREL) 100 mg tablet; Take 1 Tab by mouth nightly. - Increased from 50 to 100mg   - F/U if no improvement     2. Anxiety and depression  - Can continue to hold zoloft, resume if anxiety/depression recurs     3.  Penile lesion  - Advised sounds like it may be small cyst, can continue to monitor for now. Reviewed s/sxs of infection to monitor for and discussed he can call office if it increases in size, becomes more swollen or painful, or has purulence. Will need to see lesion if worsens. Follow-up and Dispositions    · Return if symptoms worsen or fail to improve. I spent at least 15 minutes with this established patient, and >50% of the time was spent counseling and/or coordinating care regarding insomnia, depression f/u, penile lesion      We discussed the expected course, resolution and complications of the diagnosis(es) in detail. Medication risks, benefits, costs, interactions, and alternatives were discussed as indicated. I advised him to contact the office if his condition worsens, changes or fails to improve as anticipated. He expressed understanding with the diagnosis(es) and plan. Mima Gaona is a 31 y.o. male being evaluated by a video visit encounter for concerns as above. A caregiver was present when appropriate. Due to this being a TeleHealth encounter (During [facility]-90 public health emergency), evaluation of the following organ systems was limited: Vitals/Constitutional/EENT/Resp/CV/GI//MS/Neuro/Skin/Heme-Lymph-Imm. Pursuant to the emergency declaration under the [facility], 1135 waiver authority and the "[employer] and [employer] Act, this Virtual  Visit was conducted, with patient's (and/or legal guardian's) consent, to reduce the patient's risk of exposure to COVID-19 and provide necessary medical care. Services were provided through a video synchronous discussion virtually to substitute for in-person clinic visit. Patient and provider were located at their individual homes.         Desire Matias NP

## 2020-09-16 DIAGNOSIS — G47.00 INSOMNIA, UNSPECIFIED TYPE: ICD-10-CM

## 2020-09-16 RX ORDER — TRAZODONE HYDROCHLORIDE 100 MG/1
100 TABLET ORAL
Qty: 90 TAB | Refills: 3 | Status: SHIPPED | OUTPATIENT
Start: 2020-09-16

## 2020-09-22 ENCOUNTER — DOCUMENTATION ONLY (OUTPATIENT)
Dept: FAMILY MEDICINE CLINIC | Age: 30
End: 2020-09-22

## 2020-09-22 NOTE — PROGRESS NOTES
[facility] and [facility] medical records request was faxed to [facility] at [phone] to be processed on 09/18/2020

## 2020-09-29 ENCOUNTER — VIRTUAL VISIT (OUTPATIENT)
Dept: FAMILY MEDICINE CLINIC | Age: 30
End: 2020-09-29
Payer: MEDICAID

## 2020-09-29 DIAGNOSIS — Z74.09 IMPAIRED MOBILITY AND ADLS: ICD-10-CM

## 2020-09-29 DIAGNOSIS — H10.9 CONJUNCTIVITIS, UNSPECIFIED CONJUNCTIVITIS TYPE, UNSPECIFIED LATERALITY: ICD-10-CM

## 2020-09-29 DIAGNOSIS — G82.20 PARAPLEGIC SPINAL PARALYSIS (HCC): Primary | ICD-10-CM

## 2020-09-29 DIAGNOSIS — Z78.9 IMPAIRED MOBILITY AND ADLS: ICD-10-CM

## 2020-09-29 DIAGNOSIS — N39.0 URINARY TRACT INFECTION WITHOUT HEMATURIA, SITE UNSPECIFIED: ICD-10-CM

## 2020-09-29 PROCEDURE — 99214 OFFICE O/P EST MOD 30 MIN: CPT | Performed by: FAMILY MEDICINE

## 2020-09-29 RX ORDER — CIPROFLOXACIN 250 MG/1
250 TABLET, FILM COATED ORAL EVERY 12 HOURS
Qty: 20 TAB | Refills: 0 | Status: SHIPPED | OUTPATIENT
Start: 2020-09-29 | End: 2020-10-09

## 2020-09-29 RX ORDER — POLYMYXIN B SULFATE AND TRIMETHOPRIM 1; 10000 MG/ML; [USP'U]/ML
1 SOLUTION OPHTHALMIC EVERY 6 HOURS
Qty: 1 BOTTLE | Refills: 0 | Status: SHIPPED | OUTPATIENT
Start: 2020-09-29 | End: 2020-10-06

## 2020-09-29 RX ORDER — ACYCLOVIR 200 MG/1
200 CAPSULE ORAL DAILY
Qty: 30 CAP | Refills: 5 | Status: SHIPPED | OUTPATIENT
Start: 2020-09-29 | End: 2020-10-09

## 2020-09-29 NOTE — PROGRESS NOTES
Patient here today with several different issues and concerns. He is currently bedridden with paraplegia from accident and spinal injury. He suffers from chronic herpetic outbreaks and would like a daily medication to prevent this. In addition he had a recent eye exam with a foot drop in his eyes and is now having redness of his eyes and yellow discharge and matting. He also complains of urinary tract symptoms since he is chronically cathed and would like an antibiotic for this as well. Consent: Oksana Alberto, who was seen by synchronous (real-time) audio-video technology, and/or his healthcare decision maker, is aware that this patient-initiated, Telehealth encounter on 9/29/2020 is a billable service, with coverage as determined by his insurance carrier. He is aware that he may receive a bill and has provided verbal consent to proceed: YES-Consent obtained within past 12 months        712  Subjective:   Oksana Alberto is a 31 y.o. male who was seen for No chief complaint on file. Prior to Admission medications    Medication Sig Start Date End Date Taking? Authorizing Provider   ciprofloxacin HCl (CIPRO) 250 mg tablet Take 1 Tab by mouth every twelve (12) hours for 10 days. 9/29/20 10/9/20 Yes Mary Goel MD   acyclovir (ZOVIRAX) 200 mg capsule Take 1 Cap by mouth daily for 10 days. 9/29/20 10/9/20 Yes Mary Goel MD   trimethoprim-polymyxin b (POLYTRIM) ophthalmic solution Administer 1 Drop to left eye every six (6) hours for 7 days. 9/29/20 10/6/20 Yes Mary Goel MD   traZODone (DESYREL) 100 mg tablet Take 1 Tab by mouth nightly. 9/16/20   Cindy Hook NP   sertraline (ZOLOFT) 50 mg tablet Take 1 Tab by mouth daily. 8/18/20   Aamir Lei NP   dantrolene (DANTRIUM) 50 mg capsule TAKE TWO CAPSULES BY MOUTH FOUR TIMES A DAY 5/29/20   Cindy Hook NP   ASA/acetaminophen/caffeine/pot (GOODY'S HEADACHE POWDER PO) Take  by mouth.     Provider, Historical   cyclobenzaprine (FLEXERIL) 5 mg tablet Take 1 Tab by mouth nightly as needed for Muscle Spasm(s). 10/11/19   Darrell Campbell, DO   topiramate (TOPAMAX) 25 mg tablet 1 tab PO Qhs week 1 then 1 bid week 2 then 2HS 1am week 3 then 2am and HS week 4 and beyond. 10/11/19   Malachi Campbell,    glecaprevir-pibrentasvir (MAVYRET) 100-40 mg tab Take 3 Tabs by mouth daily. 1/29/19   PRAVEEN Dozier   aspirin-acetaminophen-caffeine (EXCEDRIN ES) 383-721-78 mg per tablet Take 1 Tab by mouth every six (6) hours as needed. Provider, Historical   ibuprofen (MOTRIN) 800 mg tablet Take  by mouth every six (6) hours as needed. Provider, Historical     No Known Allergies    ROS    Objective:   Vital Signs: (As obtained by patient/caregiver at home)  There were no vitals taken for this visit.      [INSTRUCTIONS:  \"[x]\" Indicates a positive item  \"[]\" Indicates a negative item  -- DELETE ALL ITEMS NOT EXAMINED]    Constitutional: [x] Appears well-developed and well-nourished [x] No apparent distress      [] Abnormal -     Mental status: [x] Alert and awake  [x] Oriented to person/place/time [x] Able to follow commands    [] Abnormal -     Eyes:   EOM    [x]  Normal    [] Abnormal -   Sclera  [x]  Normal    [] Abnormal -          Discharge [x]  None visible   [] Abnormal -     HENT: [x] Normocephalic, atraumatic  [] Abnormal -   [x] Mouth/Throat: Mucous membranes are moist    External Ears [x] Normal  [] Abnormal -    Neck: [x] No visualized mass [] Abnormal -     Pulmonary/Chest: [x] Respiratory effort normal   [x] No visualized signs of difficulty breathing or respiratory distress        [] Abnormal -        Neurological:        [x] No Facial Asymmetry (Cranial nerve 7 motor function) (limited exam due to video visit)          [x] No gaze palsy        [] Abnormal -          Skin:        [x] No significant exanthematous lesions or discoloration noted on facial skin         [] Abnormal -            Psychiatric:       [x] Normal Affect [] Abnormal -        [x] No Hallucinations    Other pertinent observable physical exam findings:-              Assessment & Plan:   Diagnoses and all orders for this visit:    1. Paraplegic spinal paralysis ([address]  -Continue with plan    2. Impaired mobility and ADLs  -As above    3. Urinary tract infection without hematuria, site unspecified  -     ciprofloxacin HCl (CIPRO) 250 mg tablet; Take 1 Tab by mouth every twelve (12) hours for 10 days.  -Add Rx continue with home care    4. Conjunctivitis, unspecified conjunctivitis type, unspecified laterality  -     trimethoprim-polymyxin b (POLYTRIM) ophthalmic solution; Administer 1 Drop to left eye every six (6) hours for 7 days.  -Add new Rx    Other orders  -     acyclovir (ZOVIRAX) 200 mg capsule; Take 1 Cap by mouth daily for 10 days.  -Add medication for prevention                  We discussed the expected course, resolution and complications of the diagnosis(es) in detail. Medication risks, benefits, costs, interactions, and alternatives were discussed as indicated. I advised him to contact the office if his condition worsens, changes or fails to improve as anticipated. He expressed understanding with the diagnosis(es) and plan. Lelo Keyes is a 31 y.o. male being evaluated by a video visit encounter for concerns as above. A caregiver was present when appropriate. Due to this being a TeleHealth encounter (During [facility]-85 public health emergency), evaluation of the following organ systems was limited: Vitals/Constitutional/EENT/Resp/CV/GI//MS/Neuro/Skin/Heme-Lymph-Imm. Pursuant to the emergency declaration under the [facility], 1135 waiver authority and the [employer] and [employer] Act, this Virtual  Visit was conducted, with patient's (and/or legal guardian's) consent, to reduce the patient's risk of exposure to COVID-19 and provide necessary medical care.      Services were provided through a video synchronous discussion virtually to substitute for in-person clinic visit. Patient and provider were located at their individual homes.         Anthony Lewis MD

## 2020-10-20 ENCOUNTER — TELEPHONE (OUTPATIENT)
Dept: FAMILY MEDICINE CLINIC | Age: 30
End: 2020-10-20

## 2020-10-20 NOTE — TELEPHONE ENCOUNTER
Meme Hernandez, patients caregiver called and would like a call back regarding getting an in office appt. She states that patients uti has not cleared up. She states that he has a cough that she says is not covid related. She also would like to have his lungs listened to while here. Please call her back at [phone] or his aunt at [phone] to discuss if can come in office.

## 2020-10-21 NOTE — TELEPHONE ENCOUNTER
Pt's caregiver, Marlo Nix, advised as below per NP Tiffanie Bills. Verbalizes understanding & is agreeable to plan. States she will call back to make appt.

## 2020-10-26 ENCOUNTER — VIRTUAL VISIT (OUTPATIENT)
Dept: FAMILY MEDICINE CLINIC | Age: 30
End: 2020-10-26
Payer: MEDICAID

## 2020-10-26 DIAGNOSIS — N39.0 URINARY TRACT INFECTION WITHOUT HEMATURIA, SITE UNSPECIFIED: Primary | ICD-10-CM

## 2020-10-26 DIAGNOSIS — R39.15 URINARY URGENCY: Primary | ICD-10-CM

## 2020-10-26 DIAGNOSIS — R39.15 URINARY URGENCY: ICD-10-CM

## 2020-10-26 PROBLEM — B37.5: Status: ACTIVE | Noted: 2020-05-04

## 2020-10-26 PROCEDURE — 99442 PR PHYS/QHP TELEPHONE EVALUATION 11-20 MIN: CPT | Performed by: NURSE PRACTITIONER

## 2020-10-26 NOTE — PROGRESS NOTES
Arnold Mckinney is a 31 y.o. male, evaluated via audio-only technology on 10/26/2020 for Urgency (sx's 1 week) and Urinary Frequency (states that he has been treated a few times and then sx's will return)  . Assessment & Plan:   Diagnoses and all orders for this visit:    1. Urinary urgency  -     CULTURE, URINE; Future  - Nurse to fax to [address] [facility], pt will go today or tomorrow. Discussed at increased risk for resistant UTI d/t straight cath, will treat based on culture results. Push fluids. Follow-up and Dispositions    · Return if symptoms worsen or fail to improve. 12  Subjective:   Here for urinary urgency and frequency X 1 week. Was having similar symptoms when last seen in Sept and completed 10 days cipro, states sxs improved then returned shortly after   Still requiring straight cath d/t hx of CVA / hydrocephalus - had prolonged hospital course at [address] [place] [address] for candidal meningitis in January [facility] 4[facility], see note from 5/4/20 for full hx of hospitalization. Has been working w/ HH on strength, can walk w/ walker. Reports urine is cloudy, but denies being malodorous. Would be able to go to [facility] for culture today. Denies other medical complaints today. Prior to Admission medications    Medication Sig Start Date End Date Taking? Authorizing Provider   dantrolene (DANTRIUM) 50 mg capsule TAKE TWO CAPSULES BY MOUTH FOUR TIMES A DAY 5/29/20  Yes Ajit Free, NP   traZODone (DESYREL) 100 mg tablet Take 1 Tab by mouth nightly. 9/16/20   Ajit Free, NP   sertraline (ZOLOFT) 50 mg tablet Take 1 Tab by mouth daily. 8/18/20   Lethia Osei, NP   ASA/acetaminophen/caffeine/pot (GOODY'S HEADACHE POWDER PO) Take  by mouth. Provider, Historical   cyclobenzaprine (FLEXERIL) 5 mg tablet Take 1 Tab by mouth nightly as needed for Muscle Spasm(s).  10/11/19   Darrell Campbell,    topiramate (TOPAMAX) 25 mg tablet 1 tab PO Qhs week 1 then 1 bid week 2 then 2HS 1am week 3 then 2am and HS week 4 and beyond. 10/11/19   Lisa Campbell,    glecaprevir-pibrentasvir (MAVYRET) 100-40 mg tab Take 3 Tabs by mouth daily. Patient not taking: Reported on 10/26/2020 1/29/19   PRAVEEN Christianson   aspirin-acetaminophen-caffeine [facility]) 902-919-80 mg per tablet Take 1 Tab by mouth every six (6) hours as needed. Provider, Historical   ibuprofen (MOTRIN) 800 mg tablet Take  by mouth every six (6) hours as needed. 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 Raine Collinsbons, who was evaluated through a patient-initiated, synchronous (real-time) audio only encounter, and/or her healthcare decision maker, is aware that it is a billable service, with coverage as determined by his insurance carrier. He provided verbal consent to proceed: Yes. He has not had a related appointment within my department in the past 7 days or scheduled within the next 24 hours.       Total Time: minutes: 11-20 minutes    Jadon Tavera NP

## 2020-10-26 NOTE — PROGRESS NOTES
Consent: Fly Alejo, who was seen by synchronous (real-time) audio-video technology, and/or his healthcare decision maker, is aware that this patient-initiated, Telehealth encounter on 10/26/2020 is a billable service, with coverage as determined by his insurance carrier. He is aware that he may receive a bill and has provided verbal consent to proceed: Yes. [address] Subjective:  
Fly Alejo is a 31 y.o. male who was seen for Urgency (sx's 1 week) and Urinary Frequency (states that he has been treated a few times and then sx's will return) Here for urinary urgency and frequency X 1 week. Was having similar symptoms when last seen in Sept and completed 10 days cipro, states sxs improved then returned shortly after Still requiring straight cath d/t hx of CVA / hydrocephalus - had prolonged hospital course at [address] [place] [address] for candidal meningitis in January [facility] to [facility] 4/[facility], see note from 5/4/20 for full hx of hospitalization. Has been working w/ HH on strength, can walk w/ walker. Reports urine is cloudy, but denies being malodorous. Would be able to go to [facility] for culture today. Denies other medical complaints today. Prior to Admission medications Medication Sig Start Date End Date Taking? Authorizing Provider  
dantrolene (DANTRIUM) 50 mg capsule TAKE TWO CAPSULES BY MOUTH FOUR TIMES A DAY 5/29/20  Yes Inell Patient, NP  
traZODone (DESYREL) 100 mg tablet Take 1 Tab by mouth nightly. 9/16/20   Inell Patient, NP  
sertraline (ZOLOFT) 50 mg tablet Take 1 Tab by mouth daily. 8/18/20   Sathya Dates, NP  
ASA/acetaminophen/caffeine/pot (GOODY'S HEADACHE POWDER PO) Take  by mouth. Provider, Historical  
cyclobenzaprine (FLEXERIL) 5 mg tablet Take 1 Tab by mouth nightly as needed for Muscle Spasm(s). 10/11/19   Darrell Campbell,   
topiramate (TOPAMAX) 25 mg tablet 1 tab PO Qhs week 1 then 1 bid week 2 then 2HS 1am week 3 then 2am and HS week 4 and beyond.  10/11/19   Adrian Benitez Almaguer DO  
glecaprevir-pibrentasvir (MAVYRET) 100-40 mg tab Take 3 Tabs by mouth daily. Patient not taking: Reported on 10/26/2020 1/29/19   Hart Goltz, PA  
aspirin-acetaminophen-caffeine [facility]) 608-375-77 mg per tablet Take 1 Tab by mouth every six (6) hours as needed. Provider, Historical  
ibuprofen (MOTRIN) 800 mg tablet Take  by mouth every six (6) hours as needed. Provider, Historical  
 
No Known Allergies Patient Active Problem List  
 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  
Vital Signs: (As obtained by patient/caregiver at home) There were no vitals taken for this visit. Physical Exam:  
General: alert, cooperative, no distress Mental  status: normal mood, behavior, speech, dress, motor activity, and thought processes, able to follow commands HEENT: normocephalic, atraumatic Eyes:  extraocular movements intact, sclera normal, no visible discharge Ears:  external ears normal  
Mouth/Throat:  mucous memrates appear moist   
Neck: no visualized mass Resp: respiratory effort is normal, breathing appears non-labored, no visualized signs of respiratory distress Neuro: no gross deficits Skin: no discoloration or lesions of concern on visible areas Psychiatric: normal affect, consistent with stated mood, no evidence of hallucinations Additional exam findings:  
  
Assessment & Plan:  
Diagnoses and all orders for this visit: 
 
1. Urinary urgency 
-     CULTURE, URINE; Future - Nurse to fax to [address] [facility], pt will go today or tomorrow. Discussed at increased risk for resistant UTI d/t straight cath, will treat based on culture results. Push fluids. I spent at least 15 minutes with this established patient, and >50% of the time was spent counseling and/or coordinating care regarding UTI sxs We discussed the expected course, resolution and complications of the diagnosis(es) in detail. Medication risks, benefits, costs, interactions, and alternatives were discussed as indicated. I advised him to contact the office if his condition worsens, changes or fails to improve as anticipated. He expressed understanding with the diagnosis(es) and plan. Adi Smiley is a 31 y.o. male being evaluated by a video visit encounter for concerns as above. A caregiver was present when appropriate. Due to this being a TeleHealth encounter (During [facility]-15 public health emergency), evaluation of the following organ systems was limited: Vitals/Constitutional/EENT/Resp/CV/GI//MS/Neuro/Skin/Heme-Lymph-Imm. Pursuant to the emergency declaration under the [facility] waiver authority and the [employer] Act, this Virtual  Visit was conducted, with patient's (and/or legal guardian's) consent, to reduce the patient's risk of exposure to COVID-19 and provide necessary medical care. Services were provided through a video synchronous discussion virtually to substitute for in-person clinic visit. Patient and provider were located at their individual homes.  
 
 
 
Don Bhatt NP

## 2020-10-26 NOTE — PROGRESS NOTES
Jose Melvin is a 31 y.o. male , id x 2(name and ). Reviewed questionnaires, and  medications.     Chief Complaint   Patient presents with    Urgency     sx's 1 week    Urinary Frequency     states that he has been treated a few times and then sx's will return       3 most recent PHQ Screens 10/26/2020   Little interest or pleasure in doing things Not at all   Feeling down, depressed, irritable, or hopeless Not at all   Total Score PHQ 2 0

## 2020-11-02 ENCOUNTER — TELEPHONE (OUTPATIENT)
Dept: FAMILY MEDICINE CLINIC | Age: 30
End: 2020-11-02

## 2020-11-02 NOTE — TELEPHONE ENCOUNTER
Caregiver Shae checking on status of urine culture results done at [facility] last Tuesday 10/27/20.  Patient lives with his [address] she can be reached @[phone]

## 2020-11-04 NOTE — TELEPHONE ENCOUNTER
Called [facility] and they dont have results in the system. Called pt and asked if there was a way for them to have sample taken to [facility] so that we can get this taken care of.  Pt states that he has to talk with his care giver

## 2020-11-05 DIAGNOSIS — R39.15 URINARY URGENCY: ICD-10-CM

## 2020-11-05 DIAGNOSIS — R39.15 URINARY URGENCY: Primary | ICD-10-CM

## 2020-11-08 LAB
BACTERIA UR CULT: ABNORMAL
BACTERIA UR CULT: ABNORMAL

## 2020-11-09 ENCOUNTER — DOCUMENTATION ONLY (OUTPATIENT)
Dept: FAMILY MEDICINE CLINIC | Age: 30
End: 2020-11-09

## 2020-11-09 RX ORDER — SULFAMETHOXAZOLE AND TRIMETHOPRIM 800; 160 MG/1; MG/1
1 TABLET ORAL 2 TIMES DAILY
Qty: 20 TAB | Refills: 0 | Status: SHIPPED | OUTPATIENT
Start: 2020-11-09 | End: 2020-12-09 | Stop reason: SDUPTHER

## 2020-11-09 NOTE — PROGRESS NOTES
Please call to inform that urine culture confirmed UTI and Bactrim has been sent to pharmacy. He will take BID X 10 days. Results were also released via [employer] as follows:    Hi Mr Jordy Sanches,    Your urine culture confirmed you have a UTI, I have sent the appropriate antibiotic to your pharmacy. It will be Bactrim, taken twice daily for 10 days. Please let me know if you have any questions!      Lexi Layton NP

## 2020-11-09 NOTE — PROGRESS NOTES
Lab req was faxed on 10/26/2020 to [facility]. by Dakota Barry LPN.     Fax confirmation #: [id number]

## 2020-12-01 ENCOUNTER — TELEPHONE (OUTPATIENT)
Dept: FAMILY MEDICINE CLINIC | Age: 30
End: 2020-12-01

## 2020-12-02 DIAGNOSIS — R30.0 DYSURIA: Primary | ICD-10-CM

## 2020-12-02 DIAGNOSIS — R30.0 DYSURIA: ICD-10-CM

## 2020-12-09 DIAGNOSIS — N39.0 URINARY TRACT INFECTION WITHOUT HEMATURIA, SITE UNSPECIFIED: ICD-10-CM

## 2020-12-09 RX ORDER — SULFAMETHOXAZOLE AND TRIMETHOPRIM 800; 160 MG/1; MG/1
1 TABLET ORAL 2 TIMES DAILY
Qty: 20 TAB | Refills: 0 | Status: SHIPPED | OUTPATIENT
Start: 2020-12-09 | End: 2020-12-10 | Stop reason: ALTCHOICE

## 2020-12-09 RX ORDER — DIMETHICONE 13 MG/ML
1 LOTION TOPICAL DAILY
Qty: 30 CAP | Refills: 5 | Status: SHIPPED | OUTPATIENT
Start: 2020-12-09 | End: 2020-12-10 | Stop reason: ALTCHOICE

## 2020-12-10 ENCOUNTER — DOCUMENTATION ONLY (OUTPATIENT)
Dept: FAMILY MEDICINE CLINIC | Age: 30
End: 2020-12-10

## 2020-12-10 DIAGNOSIS — N39.0 RECURRENT UTI: Primary | ICD-10-CM

## 2020-12-10 DIAGNOSIS — N39.0 URINARY TRACT INFECTION WITHOUT HEMATURIA, SITE UNSPECIFIED: Primary | ICD-10-CM

## 2020-12-10 LAB
BACTERIA UR CULT: ABNORMAL
BACTERIA UR CULT: ABNORMAL

## 2020-12-10 RX ORDER — TITANIUM DIOXIDE, OCTINOXATE, ZINC OXIDE 4.61; 1.6; .78 G/40ML; G/40ML; G/40ML
1 CREAM TOPICAL DAILY
Qty: 30 CAP | Refills: 5 | Status: SHIPPED | OUTPATIENT
Start: 2020-12-10

## 2020-12-10 RX ORDER — CIPROFLOXACIN 500 MG/1
500 TABLET ORAL 2 TIMES DAILY
Qty: 28 TAB | Refills: 0 | Status: SHIPPED | OUTPATIENT
Start: 2020-12-10 | End: 2020-12-24

## 2020-12-10 NOTE — PROGRESS NOTES
Called patient and ID X 2. Notified to stop Bactrim and start Cipro based on urine culture. Sent 14 days to pharmacy. F/u if no improvement. Pt verbalized understanding.

## 2020-12-10 NOTE — PROGRESS NOTES
[address] at [facility] request for medical records was faxed to [address] [place] [address] to be processed.

## 2021-02-08 ENCOUNTER — TELEPHONE (OUTPATIENT)
Dept: FAMILY MEDICINE CLINIC | Age: 31
End: 2021-02-08

## 2021-02-08 DIAGNOSIS — R25.2 SPASTICITY: ICD-10-CM

## 2021-02-08 RX ORDER — DANTROLENE SODIUM 50 MG/1
CAPSULE ORAL
Qty: 240 CAP | Refills: 5 | Status: SHIPPED | OUTPATIENT
Start: 2021-02-08 | End: 2021-08-10

## 2021-02-17 ENCOUNTER — OFFICE VISIT (OUTPATIENT)
Dept: FAMILY MEDICINE CLINIC | Age: 31
End: 2021-02-17
Payer: MEDICAID

## 2021-02-17 VITALS
BODY MASS INDEX: 18.34 KG/M2 | TEMPERATURE: 98 F | HEIGHT: 71 IN | OXYGEN SATURATION: 96 % | HEART RATE: 84 BPM | WEIGHT: 131 LBS | RESPIRATION RATE: 20 BRPM | SYSTOLIC BLOOD PRESSURE: 130 MMHG | DIASTOLIC BLOOD PRESSURE: 83 MMHG

## 2021-02-17 DIAGNOSIS — Z01.818 PRE-OP EVALUATION: Primary | ICD-10-CM

## 2021-02-17 DIAGNOSIS — T88.7XXA MEDICATION SIDE EFFECTS: ICD-10-CM

## 2021-02-17 PROBLEM — Z98.2 S/P VP SHUNT: Status: ACTIVE | Noted: 2021-02-17

## 2021-02-17 PROCEDURE — 99214 OFFICE O/P EST MOD 30 MIN: CPT | Performed by: FAMILY MEDICINE

## 2021-02-17 RX ORDER — LOSARTAN POTASSIUM 50 MG/1
50 TABLET ORAL DAILY
Qty: 30 TAB | Refills: 1 | Status: SHIPPED | OUTPATIENT
Start: 2021-02-17 | End: 2021-02-17

## 2021-02-17 RX ORDER — LOSARTAN POTASSIUM 25 MG/1
25 TABLET ORAL DAILY
Qty: 30 TAB | Refills: 1 | Status: SHIPPED | OUTPATIENT
Start: 2021-02-17 | End: 2021-02-17 | Stop reason: SDUPTHER

## 2021-02-17 RX ORDER — LOSARTAN POTASSIUM 25 MG/1
TABLET ORAL
Qty: 1 TAB | Refills: 0 | Status: SHIPPED | OUTPATIENT
Start: 2021-02-17 | End: 2021-02-17

## 2021-02-17 NOTE — PATIENT INSTRUCTIONS
A Healthy Lifestyle: Care Instructions Your Care Instructions A healthy lifestyle can help you feel good, stay at a healthy weight, and have plenty of energy for both work and play. A healthy lifestyle is something you can share with your whole family. A healthy lifestyle also can lower your risk for serious health problems, such as high blood pressure, heart disease, and diabetes. You can follow a few steps listed below to improve your health and the health of your family. Follow-up care is a key part of your treatment and safety. Be sure to make and go to all appointments, and call your doctor if you are having problems. It's also a good idea to know your test results and keep a list of the medicines you take. How can you care for yourself at home? · Do not eat too much sugar, fat, or fast foods. You can still have dessert and treats now and then. The goal is moderation. · Start small to improve your eating habits. Pay attention to portion sizes, drink less juice and soda pop, and eat more fruits and vegetables. ? Eat a healthy amount of food. A 3-ounce serving of meat, for example, is about the size of a deck of cards. Fill the rest of your plate with vegetables and whole grains. ? Limit the amount of soda and sports drinks you have every day. Drink more water when you are thirsty. ? Eat at least 5 servings of fruits and vegetables every day. It may seem like a lot, but it is not hard to reach this goal. A serving or helping is 1 piece of fruit, 1 cup of vegetables, or 2 cups of leafy, raw vegetables. Have an apple or some carrot sticks as an afternoon snack instead of a candy bar.  Try to have fruits and/or vegetables at every meal. 
 · Make exercise part of your daily routine. You may want to start with simple activities, such as walking, bicycling, or slow swimming. Try to be active 30 to 60 minutes every day. You do not need to do all 30 to 60 minutes all at once. For example, you can exercise 3 times a day for 10 or 20 minutes. Moderate exercise is safe for most people, but it is always a good idea to talk to your doctor before starting an exercise program. 
· Keep moving. Beatriz Cancholat the lawn, work in the garden, or [employer]. Take the stairs instead of the elevator at work. · If you smoke, quit. People who smoke have an increased risk for heart attack, stroke, cancer, and other lung illnesses. Quitting is hard, but there are ways to boost your chance of quitting tobacco for good. ? Use nicotine gum, patches, or lozenges. ? Ask your doctor about stop-smoking programs and medicines. ? Keep trying. In addition to reducing your risk of diseases in the future, you will notice some benefits soon after you stop using tobacco. If you have shortness of breath or asthma symptoms, they will likely get better within a few weeks after you quit. · Limit how much alcohol you drink. Moderate amounts of alcohol (up to 2 drinks a day for men, 1 drink a day for women) are okay. But drinking too much can lead to liver problems, high blood pressure, and other health problems. Family health If you have a family, there are many things you can do together to improve your health. · Eat meals together as a family as often as possible. · Eat healthy foods. This includes fruits, vegetables, lean meats and dairy, and whole grains. · Include your family in your fitness plan. Most people think of activities such as jogging or tennis as the way to fitness, but there are many ways you and your family can be more active. Anything that makes you breathe hard and gets your heart pumping is exercise. Here are some tips: ? Walk to do errands or to take your child to school or the bus. 
? Go for a family bike ride after dinner instead of watching TV. Where can you learn more? Go to http://www.gray.com/ Enter Q327 in the search box to learn more about \"A Healthy Lifestyle: Care Instructions. \" Current as of: January 31, 2020               Content Version: 12.6 © 2006-2020 [employer], [employer]. Care instructions adapted under license by [employer] (which disclaims liability or warranty for this information). If you have questions about a medical condition or this instruction, always ask your healthcare professional. [address] any warranty or liability for your use of this information.

## 2021-02-17 NOTE — PROGRESS NOTES
Chief Complaint   Patient presents with    Pre-op Exam     Patient presents in office today for pre-op clearance. Scheduled for eye surgery on 2/25/21. No concerns. 1. Have you been to the ER, urgent care clinic since your last visit? Hospitalized since your last visit? No    2. Have you seen or consulted any other health care providers outside of the [address] [place] [address] since your last visit? Include any pap smears or colon screening.  No    Learning Assessment 1/29/2019   PRIMARY LEARNER Patient   HIGHEST LEVEL OF EDUCATION - PRIMARY LEARNER  -   BARRIERS PRIMARY LEARNER NONE   CO-LEARNER CAREGIVER No   PRIMARY LANGUAGE ENGLISH   LEARNER PREFERENCE PRIMARY LISTENING   ANSWERED BY patient   RELATIONSHIP SELF

## 2021-02-17 NOTE — PROGRESS NOTES
Katy Prakash is a 31 y.o. male is a 31 y.o. yo male who presents for preoperative evaluation. Pt here for pre op and states his BP has been high since his stroke. 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 Pt had a spinal tap and believes had a stroke from this states his cerebellum sits lower. Sx is planned for 2/25/21. States checks BP at home and Bp remains elevated usually at home 130's/90's. Never been on anything for HTN. Pt is on diflucan and think he may be having s/e, hair loss, discolored semen. He will discuss this with his ID doctor  Latex Allergy:NO    History of anesthesia reaction: NO    History of PE/DVT:NO    No Known Allergies    Current Outpatient Medications   Medication Sig    dantrolene (DANTRIUM) 50 mg capsule TAKE TWO CAPSULES BY MOUTH FOUR TIMES A DAY    cranberry 400 mg cap Take 1 Cap by mouth daily.  traZODone (DESYREL) 100 mg tablet Take 1 Tab by mouth nightly.  ASA/acetaminophen/caffeine/pot (GOODY'S HEADACHE POWDER PO) Take  by mouth.  aspirin-acetaminophen-caffeine (EXCEDRIN ES) 250-250-65 mg per tablet Take 1 Tab by mouth every six (6) hours as needed.  ibuprofen (MOTRIN) 800 mg tablet Take  by mouth every six (6) hours as needed.  sertraline (ZOLOFT) 50 mg tablet Take 1 Tab by mouth daily.  cyclobenzaprine (FLEXERIL) 5 mg tablet Take 1 Tab by mouth nightly as needed for Muscle Spasm(s).  topiramate (TOPAMAX) 25 mg tablet 1 tab PO Qhs week 1 then 1 bid week 2 then 2HS 1am week 3 then 2am and HS week 4 and beyond.  glecaprevir-pibrentasvir (MAVYRET) 100-40 mg tab Take 3 Tabs by mouth daily. (Patient not taking: Reported on 10/26/2020)     No current facility-administered medications for this visit.         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 Review of Systems - negative except as listed above in the HPI    Objective:     Vitals:    02/17/21 1127 02/17/21 1154   BP: (!) 152/105 130/83   Pulse: 84    Resp: 20    Temp: 98 °F (36.7 °C)    TempSrc: Oral    SpO2: 96%    Weight: 131 lb (59.4 kg)    Height: 5' 11\" (1.803 m)      Physical Examination: General appearance - alert, well appearing, and in no distress  Mental status - alert, oriented to person, place, and time  Chest - clear to auscultation, no wheezes, rales or rhonchi, symmetric air entry  Heart - normal rate, regular rhythm, normal S1, S2, no murmurs, rubs, clicks or gallops  Abdomen - soft, nontender, nondistended, no masses or organomegaly  Neurological -normal speech walks with walker    Assessment/ Plan:   Diagnoses and all orders for this visit:    1. Pre-op evaluation  -     METABOLIC PANEL, COMPREHENSIVE; Future  -     CBC W/O DIFF; Future    2. Medication side effects    Patient will discuss the side effects with his infectious disease doctor. Blood pressure was normal on recheck patient will monitor at home follow-up in 3 months. Follow-up and Dispositions    · Return in about 4 weeks (around 3/17/2021), or if symptoms worsen or fail to improve. I have discussed the diagnosis with the patient and the intended plan as seen in the above orders. The patient has received an after-visit summary and questions were answered concerning future plans. Pt conveyed understanding of plan.     Medication Side Effects and Warnings were discussed with patient      Marlon Rodriguez DO

## 2021-02-19 LAB
ALBUMIN SERPL-MCNC: 4.8 G/DL (ref 4–5)
ALBUMIN/GLOB SERPL: 2.2 {RATIO} (ref 1.2–2.2)
ALP SERPL-CCNC: 71 IU/L (ref 39–117)
ALT SERPL-CCNC: 10 IU/L (ref 0–44)
AST SERPL-CCNC: 13 IU/L (ref 0–40)
BILIRUB SERPL-MCNC: 0.4 MG/DL (ref 0–1.2)
BUN SERPL-MCNC: 9 MG/DL (ref 6–20)
BUN/CREAT SERPL: 13 (ref 9–20)
CALCIUM SERPL-MCNC: 10.1 MG/DL (ref 8.7–10.2)
CHLORIDE SERPL-SCNC: 103 MMOL/L (ref 96–106)
CO2 SERPL-SCNC: 20 MMOL/L (ref 20–29)
CREAT SERPL-MCNC: 0.71 MG/DL (ref 0.76–1.27)
ERYTHROCYTE [DISTWIDTH] IN BLOOD BY AUTOMATED COUNT: 12.4 % (ref 11.6–15.4)
GLOBULIN SER CALC-MCNC: 2.2 G/DL (ref 1.5–4.5)
GLUCOSE SERPL-MCNC: 66 MG/DL (ref 65–99)
HCT VFR BLD AUTO: 45.2 % (ref 37.5–51)
HGB BLD-MCNC: 15.7 G/DL (ref 13–17.7)
MCH RBC QN AUTO: 30.6 PG (ref 26.6–33)
MCHC RBC AUTO-ENTMCNC: 34.7 G/DL (ref 31.5–35.7)
MCV RBC AUTO: 88 FL (ref 79–97)
PLATELET # BLD AUTO: 281 X10E3/UL (ref 150–450)
POTASSIUM SERPL-SCNC: 3.6 MMOL/L (ref 3.5–5.2)
PROT SERPL-MCNC: 7 G/DL (ref 6–8.5)
RBC # BLD AUTO: 5.13 X10E6/UL (ref 4.14–5.8)
SODIUM SERPL-SCNC: 146 MMOL/L (ref 134–144)
SPECIMEN STATUS REPORT, ROLRST: NORMAL
WBC # BLD AUTO: 6 X10E3/UL (ref 3.4–10.8)

## 2021-02-19 NOTE — PROGRESS NOTES
Pre-op Clearance form faxed to [facility] Dept of ophthalmology. Fax number [phone] confirmation number [id number].

## 2021-04-30 ENCOUNTER — TELEPHONE (OUTPATIENT)
Dept: FAMILY MEDICINE CLINIC | Age: 31
End: 2021-04-30

## 2021-04-30 NOTE — TELEPHONE ENCOUNTER
Office notes faxed to [facility]. Fax number [phone] confirmation number [id number].

## 2021-04-30 NOTE — TELEPHONE ENCOUNTER
Nathalia/ U Urology Dept- patient is coming in 05.05.21 to see Dr Roxanne Hoyos and she is requesting medical notes especially Nurogenic Bladder.  Nathalia's phone: [phone] Fax: [phone]

## 2021-05-05 ENCOUNTER — DOCUMENTATION ONLY (OUTPATIENT)
Dept: FAMILY MEDICINE CLINIC | Age: 31
End: 2021-05-05

## 2021-05-05 NOTE — PROGRESS NOTES
Patients medical records request was faxed to [facility] at [phone] to be processed on 05/04/2021.

## 2021-08-10 DIAGNOSIS — R25.2 SPASTICITY: ICD-10-CM

## 2021-08-10 RX ORDER — DANTROLENE SODIUM 50 MG/1
CAPSULE ORAL
Qty: 240 CAPSULE | Refills: 1 | Status: SHIPPED | OUTPATIENT
Start: 2021-08-10 | End: 2021-10-11

## 2021-10-11 DIAGNOSIS — R25.2 SPASTICITY: ICD-10-CM

## 2021-10-11 RX ORDER — DANTROLENE SODIUM 50 MG/1
CAPSULE ORAL
Qty: 240 CAPSULE | Refills: 1 | Status: SHIPPED | OUTPATIENT
Start: 2021-10-11 | End: 2022-07-13

## 2022-03-18 PROBLEM — G44.229 CHRONIC TENSION HEADACHES: Status: ACTIVE | Noted: 2019-01-29

## 2022-03-18 PROBLEM — G82.20 PARAPLEGIC SPINAL PARALYSIS (HCC): Status: ACTIVE | Noted: 2020-09-29

## 2022-03-19 PROBLEM — Z98.2 S/P VP SHUNT: Status: ACTIVE | Noted: 2021-02-17

## 2022-03-19 PROBLEM — Z74.09 IMPAIRED MOBILITY AND ADLS: Status: ACTIVE | Noted: 2020-05-04

## 2022-03-19 PROBLEM — Z78.9 IMPAIRED MOBILITY AND ADLS: Status: ACTIVE | Noted: 2020-05-04

## 2022-03-20 PROBLEM — B37.5: Status: ACTIVE | Noted: 2020-05-04

## 2022-03-20 PROBLEM — B18.2 CHRONIC HEPATITIS C (HCC): Status: ACTIVE | Noted: 2019-01-29

## 2022-07-13 DIAGNOSIS — R25.2 SPASTICITY: ICD-10-CM

## 2022-07-13 RX ORDER — DANTROLENE SODIUM 50 MG/1
CAPSULE ORAL
Qty: 240 CAPSULE | Refills: 5 | Status: SHIPPED | OUTPATIENT
Start: 2022-07-13

## 2024-03-25 NOTE — TELEPHONE ENCOUNTER
Aunt Francyne Dance checking on status of urine culture which is not back yet. She states that patient is a paralplegic so uti are very hard & painful. Can you send in a antibiotic now just to get started & may help with the pain? Can you also send in a rx for cranberry?  Please call Francyne Dance back @[phone] & let her know today
Aunt dropped off urine specimen on Monday,unable to locate urine specimen or results. Aunt will come to office to  urine cup an drop specimen off this afternoon-advised to have nurse only grab specimen from pt's caregiver. Please get Theodora for processing and handling of specimen. Aunt has been notified of abx therapy sent to pharmacy and will f/u with urologist at a [facility] location since pt's other specialist are there.
Fabi Parikh, pt's aunt called. Pt has another uti. Aunt was wondering if office needed a urine sample and she would like some kind of preventative started for him if possible. Please call her at [phone].
Pt aunt [facility] calling and states dropped off urine sample for uti symptoms and wanted to know if anything back yet. She wants to know while waiting for results can a antibiotic be sent to pharmacy to help with symptoms. Also wanted to know if a rx for cranberry would help to stop him from getting the recurrent uti. I explained Klaudia Mcgowan not here today she asked if can send to nurse and ask another provider in the office. She can be reached at [phone].
Pt id x 3. Pt states that he started back at work today and is not able to leave sample until Thursday or Friday. Advised that I would let Devan Danielle and the screeners know. Pt verbalized understanding.
Received urine specimen. Glu:neg  Kan:neg  Ket:neg  S.025  Blo:neg  PH:6.0  Pro:neg  Uro:2.0  Nit:neg  Anselmo:1+    Slight abnormality notified,advised to complete bactrim that was sent today,if no improvement in sx,pt needs to est care with urologist,aunt stated she understood.
Reviewed and in agreement.
Reviewed.
Will send out sample once received.
community integration to address functional deficits and attain remaining goals.    Progress toward goals / Updated goals:  []  See Progress Note/Recertification    1. Patient to improve cervical side bend ROM by 5 degrees or more to improve ability to turn head during ambulation while crossing the street   Status at IE Cervical AROM:                                           AROM (deg)                  Right Left   Flexion 31   Extension 17   Side Bend 11 22   Rotation 32 35                  Progressing, no change*:     Right Left   Flexion 30*   Extension 37   Side Bend 15 25   Rotation 32* 45                Reassess NV [Date assessed: 2024]        2.  Patient to improve UE strength to 4+/5 without increased pain to improve ability for patient to perform ADLs such as lifting tasks  Status at IE Strength:    Right (/5) Left (/5)   GHJ   Flexion 4+  4+ p!             Abduction 4+ 4+ p!              Extension 4- 4- p!             ER 4-  3+ p!      Progressing:     Right (/5) Left (/5)   GHJ   Flexion 5  5              Abduction 4+ 4+               Extension 4+ 4+             ER 4- 4+ p!     3.  Patient to report worst pain as 5/10 or less to improve pain modulation and improve patient's ability to perform ADLs  Status at IE : Worst pain = 8/10  Progressin-9/10 at night     4.  FOTO score to increase to 58/100 points or greater to demonstrate improvement in functional mobility and QoL  Status at IE : Initial FOTO= 52/100  Regressin- due to avoiding heavy lifting at the moment  Regressin- pt unsure of answers with some questions and had to guess    Functional Gains: tightness and stiffness in neck and shoulders has improved, pain levels has improved and is more manageable  Functional Deficits: tingling in hands, pain with prolonged postures, limitation with turning head and pain with quick turns, random aches and pains in arms and shoulders that last a few hours, difficulty sleeping  % improvement: 
                 Ramirez Everett MD    ** Signature, Date and Time must be completed for valid certification **  [facility] or you may fax the signed copy to [phone]  Thank you.